# Patient Record
Sex: FEMALE | Race: AMERICAN INDIAN OR ALASKA NATIVE | URBAN - METROPOLITAN AREA
[De-identification: names, ages, dates, MRNs, and addresses within clinical notes are randomized per-mention and may not be internally consistent; named-entity substitution may affect disease eponyms.]

---

## 2021-02-03 ENCOUNTER — INPATIENT (INPATIENT)
Facility: HOSPITAL | Age: 56
LOS: 5 days | Discharge: HOME | End: 2021-02-09
Attending: INTERNAL MEDICINE | Admitting: INTERNAL MEDICINE
Payer: MEDICAID

## 2021-02-03 VITALS
RESPIRATION RATE: 20 BRPM | SYSTOLIC BLOOD PRESSURE: 99 MMHG | HEART RATE: 87 BPM | OXYGEN SATURATION: 100 % | TEMPERATURE: 98 F | DIASTOLIC BLOOD PRESSURE: 75 MMHG

## 2021-02-03 LAB
ALBUMIN SERPL ELPH-MCNC: 3 G/DL — LOW (ref 3.5–5.2)
ALBUMIN SERPL ELPH-MCNC: 3.8 G/DL — SIGNIFICANT CHANGE UP (ref 3.5–5.2)
ALP SERPL-CCNC: 122 U/L — HIGH (ref 30–115)
ALP SERPL-CCNC: 87 U/L — SIGNIFICANT CHANGE UP (ref 30–115)
ALT FLD-CCNC: 23 U/L — SIGNIFICANT CHANGE UP (ref 0–41)
ALT FLD-CCNC: 28 U/L — SIGNIFICANT CHANGE UP (ref 0–41)
ANION GAP SERPL CALC-SCNC: 12 MMOL/L — SIGNIFICANT CHANGE UP (ref 7–14)
ANION GAP SERPL CALC-SCNC: 15 MMOL/L — HIGH (ref 7–14)
APPEARANCE UR: CLEAR — SIGNIFICANT CHANGE UP
AST SERPL-CCNC: 48 U/L — HIGH (ref 0–41)
AST SERPL-CCNC: 56 U/L — HIGH (ref 0–41)
B-OH-BUTYR SERPL-SCNC: 1.8 MMOL/L — HIGH
BACTERIA # UR AUTO: ABNORMAL
BASE EXCESS BLDV CALC-SCNC: 2.8 MMOL/L — HIGH (ref -2–2)
BASOPHILS # BLD AUTO: 0.01 K/UL — SIGNIFICANT CHANGE UP (ref 0–0.2)
BASOPHILS # BLD AUTO: 0.02 K/UL — SIGNIFICANT CHANGE UP (ref 0–0.2)
BASOPHILS NFR BLD AUTO: 0.2 % — SIGNIFICANT CHANGE UP (ref 0–1)
BASOPHILS NFR BLD AUTO: 0.4 % — SIGNIFICANT CHANGE UP (ref 0–1)
BILIRUB DIRECT SERPL-MCNC: <0.2 MG/DL — SIGNIFICANT CHANGE UP (ref 0–0.2)
BILIRUB INDIRECT FLD-MCNC: >0.2 MG/DL — SIGNIFICANT CHANGE UP (ref 0.2–1.2)
BILIRUB SERPL-MCNC: 0.4 MG/DL — SIGNIFICANT CHANGE UP (ref 0.2–1.2)
BILIRUB SERPL-MCNC: <0.2 MG/DL — SIGNIFICANT CHANGE UP (ref 0.2–1.2)
BILIRUB UR-MCNC: NEGATIVE — SIGNIFICANT CHANGE UP
BUN SERPL-MCNC: 5 MG/DL — LOW (ref 10–20)
BUN SERPL-MCNC: 6 MG/DL — LOW (ref 10–20)
CA-I SERPL-SCNC: 1.18 MMOL/L — SIGNIFICANT CHANGE UP (ref 1.12–1.3)
CALCIUM SERPL-MCNC: 7.9 MG/DL — LOW (ref 8.5–10.1)
CALCIUM SERPL-MCNC: 8.7 MG/DL — SIGNIFICANT CHANGE UP (ref 8.5–10.1)
CHLORIDE SERPL-SCNC: 101 MMOL/L — SIGNIFICANT CHANGE UP (ref 98–110)
CHLORIDE SERPL-SCNC: 87 MMOL/L — LOW (ref 98–110)
CK MB CFR SERPL CALC: 1.3 NG/ML — SIGNIFICANT CHANGE UP (ref 0.6–6.3)
CK SERPL-CCNC: 25 U/L — SIGNIFICANT CHANGE UP (ref 0–225)
CO2 SERPL-SCNC: 22 MMOL/L — SIGNIFICANT CHANGE UP (ref 17–32)
CO2 SERPL-SCNC: 23 MMOL/L — SIGNIFICANT CHANGE UP (ref 17–32)
COLOR SPEC: SIGNIFICANT CHANGE UP
COMMENT - URINE: SIGNIFICANT CHANGE UP
CREAT SERPL-MCNC: 0.5 MG/DL — LOW (ref 0.7–1.5)
CREAT SERPL-MCNC: 0.7 MG/DL — SIGNIFICANT CHANGE UP (ref 0.7–1.5)
DIFF PNL FLD: NEGATIVE — SIGNIFICANT CHANGE UP
EOSINOPHIL # BLD AUTO: 0.01 K/UL — SIGNIFICANT CHANGE UP (ref 0–0.7)
EOSINOPHIL # BLD AUTO: 0.02 K/UL — SIGNIFICANT CHANGE UP (ref 0–0.7)
EOSINOPHIL NFR BLD AUTO: 0.2 % — SIGNIFICANT CHANGE UP (ref 0–8)
EOSINOPHIL NFR BLD AUTO: 0.4 % — SIGNIFICANT CHANGE UP (ref 0–8)
EPI CELLS # UR: 1 /HPF — SIGNIFICANT CHANGE UP (ref 0–5)
ERYTHROCYTE [SEDIMENTATION RATE] IN BLOOD: 32 MM/HR — HIGH (ref 0–20)
GAS PNL BLDV: 131 MMOL/L — LOW (ref 136–145)
GAS PNL BLDV: SIGNIFICANT CHANGE UP
GLUCOSE BLDC GLUCOMTR-MCNC: 260 MG/DL — HIGH (ref 70–99)
GLUCOSE BLDC GLUCOMTR-MCNC: 301 MG/DL — HIGH (ref 70–99)
GLUCOSE BLDC GLUCOMTR-MCNC: 310 MG/DL — HIGH (ref 70–99)
GLUCOSE BLDC GLUCOMTR-MCNC: 318 MG/DL — HIGH (ref 70–99)
GLUCOSE BLDC GLUCOMTR-MCNC: 413 MG/DL — HIGH (ref 70–99)
GLUCOSE BLDC GLUCOMTR-MCNC: 481 MG/DL — CRITICAL HIGH (ref 70–99)
GLUCOSE BLDC GLUCOMTR-MCNC: >600 MG/DL — CRITICAL HIGH (ref 70–99)
GLUCOSE SERPL-MCNC: 396 MG/DL — HIGH (ref 70–99)
GLUCOSE SERPL-MCNC: 947 MG/DL — CRITICAL HIGH (ref 70–99)
GLUCOSE UR QL: ABNORMAL
HCO3 BLDV-SCNC: 29 MMOL/L — SIGNIFICANT CHANGE UP (ref 22–29)
HCT VFR BLD CALC: 34.6 % — LOW (ref 37–47)
HCT VFR BLD CALC: 43 % — SIGNIFICANT CHANGE UP (ref 37–47)
HCT VFR BLDA CALC: 34.5 % — SIGNIFICANT CHANGE UP (ref 34–44)
HGB BLD CALC-MCNC: 11.2 G/DL — LOW (ref 14–18)
HGB BLD-MCNC: 10.5 G/DL — LOW (ref 12–16)
HGB BLD-MCNC: 13 G/DL — SIGNIFICANT CHANGE UP (ref 12–16)
HYALINE CASTS # UR AUTO: 2 /LPF — SIGNIFICANT CHANGE UP (ref 0–7)
IMM GRANULOCYTES NFR BLD AUTO: 0.4 % — HIGH (ref 0.1–0.3)
IMM GRANULOCYTES NFR BLD AUTO: 0.4 % — HIGH (ref 0.1–0.3)
KETONES UR-MCNC: SIGNIFICANT CHANGE UP
LACTATE BLDV-MCNC: 1.3 MMOL/L — SIGNIFICANT CHANGE UP (ref 0.5–1.6)
LEUKOCYTE ESTERASE UR-ACNC: ABNORMAL
LIDOCAIN IGE QN: 14 U/L — SIGNIFICANT CHANGE UP (ref 7–60)
LIDOCAIN IGE QN: 15 U/L — SIGNIFICANT CHANGE UP (ref 7–60)
LYMPHOCYTES # BLD AUTO: 1.31 K/UL — SIGNIFICANT CHANGE UP (ref 1.2–3.4)
LYMPHOCYTES # BLD AUTO: 1.52 K/UL — SIGNIFICANT CHANGE UP (ref 1.2–3.4)
LYMPHOCYTES # BLD AUTO: 25.3 % — SIGNIFICANT CHANGE UP (ref 20.5–51.1)
LYMPHOCYTES # BLD AUTO: 30.2 % — SIGNIFICANT CHANGE UP (ref 20.5–51.1)
MAGNESIUM SERPL-MCNC: 1.6 MG/DL — LOW (ref 1.8–2.4)
MCHC RBC-ENTMCNC: 24.8 PG — LOW (ref 27–31)
MCHC RBC-ENTMCNC: 25.1 PG — LOW (ref 27–31)
MCHC RBC-ENTMCNC: 30.2 G/DL — LOW (ref 32–37)
MCHC RBC-ENTMCNC: 30.3 G/DL — LOW (ref 32–37)
MCV RBC AUTO: 81.8 FL — SIGNIFICANT CHANGE UP (ref 81–99)
MCV RBC AUTO: 83 FL — SIGNIFICANT CHANGE UP (ref 81–99)
MONOCYTES # BLD AUTO: 0.31 K/UL — SIGNIFICANT CHANGE UP (ref 0.1–0.6)
MONOCYTES # BLD AUTO: 0.48 K/UL — SIGNIFICANT CHANGE UP (ref 0.1–0.6)
MONOCYTES NFR BLD AUTO: 6 % — SIGNIFICANT CHANGE UP (ref 1.7–9.3)
MONOCYTES NFR BLD AUTO: 9.5 % — HIGH (ref 1.7–9.3)
NEUTROPHILS # BLD AUTO: 2.99 K/UL — SIGNIFICANT CHANGE UP (ref 1.4–6.5)
NEUTROPHILS # BLD AUTO: 3.51 K/UL — SIGNIFICANT CHANGE UP (ref 1.4–6.5)
NEUTROPHILS NFR BLD AUTO: 59.3 % — SIGNIFICANT CHANGE UP (ref 42.2–75.2)
NEUTROPHILS NFR BLD AUTO: 67.7 % — SIGNIFICANT CHANGE UP (ref 42.2–75.2)
NITRITE UR-MCNC: NEGATIVE — SIGNIFICANT CHANGE UP
NRBC # BLD: 0 /100 WBCS — SIGNIFICANT CHANGE UP (ref 0–0)
NRBC # BLD: 0 /100 WBCS — SIGNIFICANT CHANGE UP (ref 0–0)
NT-PROBNP SERPL-SCNC: 161 PG/ML — SIGNIFICANT CHANGE UP (ref 0–300)
OSMOLALITY SERPL: 296 MOS/KG — SIGNIFICANT CHANGE UP (ref 275–300)
PCO2 BLDV: 51 MMHG — SIGNIFICANT CHANGE UP (ref 41–51)
PH BLDV: 7.36 — SIGNIFICANT CHANGE UP (ref 7.26–7.43)
PH UR: 6.5 — SIGNIFICANT CHANGE UP (ref 5–8)
PLATELET # BLD AUTO: 222 K/UL — SIGNIFICANT CHANGE UP (ref 130–400)
PLATELET # BLD AUTO: 291 K/UL — SIGNIFICANT CHANGE UP (ref 130–400)
PO2 BLDV: 29 MMHG — SIGNIFICANT CHANGE UP (ref 20–40)
POTASSIUM BLDV-SCNC: 3.3 MMOL/L — SIGNIFICANT CHANGE UP (ref 3.3–5.6)
POTASSIUM SERPL-MCNC: 3.2 MMOL/L — LOW (ref 3.5–5)
POTASSIUM SERPL-MCNC: 3.7 MMOL/L — SIGNIFICANT CHANGE UP (ref 3.5–5)
POTASSIUM SERPL-SCNC: 3.2 MMOL/L — LOW (ref 3.5–5)
POTASSIUM SERPL-SCNC: 3.7 MMOL/L — SIGNIFICANT CHANGE UP (ref 3.5–5)
PROT SERPL-MCNC: 5.7 G/DL — LOW (ref 6–8)
PROT SERPL-MCNC: 7.5 G/DL — SIGNIFICANT CHANGE UP (ref 6–8)
PROT UR-MCNC: NEGATIVE — SIGNIFICANT CHANGE UP
RBC # BLD: 4.23 M/UL — SIGNIFICANT CHANGE UP (ref 4.2–5.4)
RBC # BLD: 5.18 M/UL — SIGNIFICANT CHANGE UP (ref 4.2–5.4)
RBC # FLD: 14.5 % — SIGNIFICANT CHANGE UP (ref 11.5–14.5)
RBC # FLD: 14.7 % — HIGH (ref 11.5–14.5)
RBC CASTS # UR COMP ASSIST: 3 /HPF — SIGNIFICANT CHANGE UP (ref 0–4)
SAO2 % BLDV: 52 % — SIGNIFICANT CHANGE UP
SARS-COV-2 RNA SPEC QL NAA+PROBE: SIGNIFICANT CHANGE UP
SODIUM SERPL-SCNC: 125 MMOL/L — LOW (ref 135–146)
SODIUM SERPL-SCNC: 135 MMOL/L — SIGNIFICANT CHANGE UP (ref 135–146)
SP GR SPEC: 1.04 — HIGH (ref 1.01–1.03)
TROPONIN T SERPL-MCNC: <0.01 NG/ML — SIGNIFICANT CHANGE UP
TROPONIN T SERPL-MCNC: <0.01 NG/ML — SIGNIFICANT CHANGE UP
UROBILINOGEN FLD QL: SIGNIFICANT CHANGE UP
WBC # BLD: 5.04 K/UL — SIGNIFICANT CHANGE UP (ref 4.8–10.8)
WBC # BLD: 5.18 K/UL — SIGNIFICANT CHANGE UP (ref 4.8–10.8)
WBC # FLD AUTO: 5.04 K/UL — SIGNIFICANT CHANGE UP (ref 4.8–10.8)
WBC # FLD AUTO: 5.18 K/UL — SIGNIFICANT CHANGE UP (ref 4.8–10.8)
WBC UR QL: 45 /HPF — HIGH (ref 0–5)

## 2021-02-03 PROCEDURE — 99285 EMERGENCY DEPT VISIT HI MDM: CPT

## 2021-02-03 PROCEDURE — 93010 ELECTROCARDIOGRAM REPORT: CPT

## 2021-02-03 PROCEDURE — 76705 ECHO EXAM OF ABDOMEN: CPT | Mod: 26

## 2021-02-03 PROCEDURE — 71045 X-RAY EXAM CHEST 1 VIEW: CPT | Mod: 26

## 2021-02-03 RX ORDER — INSULIN GLARGINE 100 [IU]/ML
30 INJECTION, SOLUTION SUBCUTANEOUS AT BEDTIME
Refills: 0 | Status: DISCONTINUED | OUTPATIENT
Start: 2021-02-03 | End: 2021-02-04

## 2021-02-03 RX ORDER — INSULIN GLARGINE 100 [IU]/ML
30 INJECTION, SOLUTION SUBCUTANEOUS ONCE
Refills: 0 | Status: COMPLETED | OUTPATIENT
Start: 2021-02-03 | End: 2021-02-03

## 2021-02-03 RX ORDER — FOLIC ACID 0.8 MG
1 TABLET ORAL DAILY
Refills: 0 | Status: DISCONTINUED | OUTPATIENT
Start: 2021-02-03 | End: 2021-02-09

## 2021-02-03 RX ORDER — AZITHROMYCIN 500 MG/1
500 TABLET, FILM COATED ORAL ONCE
Refills: 0 | Status: COMPLETED | OUTPATIENT
Start: 2021-02-03 | End: 2021-02-03

## 2021-02-03 RX ORDER — DEXTROSE 50 % IN WATER 50 %
12.5 SYRINGE (ML) INTRAVENOUS ONCE
Refills: 0 | Status: DISCONTINUED | OUTPATIENT
Start: 2021-02-03 | End: 2021-02-09

## 2021-02-03 RX ORDER — CHLORHEXIDINE GLUCONATE 213 G/1000ML
1 SOLUTION TOPICAL
Refills: 0 | Status: DISCONTINUED | OUTPATIENT
Start: 2021-02-03 | End: 2021-02-09

## 2021-02-03 RX ORDER — MAGNESIUM SULFATE 500 MG/ML
1 VIAL (ML) INJECTION ONCE
Refills: 0 | Status: COMPLETED | OUTPATIENT
Start: 2021-02-03 | End: 2021-02-03

## 2021-02-03 RX ORDER — SODIUM CHLORIDE 9 MG/ML
1000 INJECTION INTRAMUSCULAR; INTRAVENOUS; SUBCUTANEOUS ONCE
Refills: 0 | Status: COMPLETED | OUTPATIENT
Start: 2021-02-03 | End: 2021-02-03

## 2021-02-03 RX ORDER — SODIUM CHLORIDE 9 MG/ML
1000 INJECTION, SOLUTION INTRAVENOUS
Refills: 0 | Status: DISCONTINUED | OUTPATIENT
Start: 2021-02-03 | End: 2021-02-09

## 2021-02-03 RX ORDER — INSULIN HUMAN 100 [IU]/ML
10 INJECTION, SOLUTION SUBCUTANEOUS ONCE
Refills: 0 | Status: COMPLETED | OUTPATIENT
Start: 2021-02-03 | End: 2021-02-03

## 2021-02-03 RX ORDER — ALBUTEROL 90 UG/1
2 AEROSOL, METERED ORAL EVERY 6 HOURS
Refills: 0 | Status: DISCONTINUED | OUTPATIENT
Start: 2021-02-03 | End: 2021-02-09

## 2021-02-03 RX ORDER — DEXTROSE 50 % IN WATER 50 %
25 SYRINGE (ML) INTRAVENOUS ONCE
Refills: 0 | Status: DISCONTINUED | OUTPATIENT
Start: 2021-02-03 | End: 2021-02-09

## 2021-02-03 RX ORDER — GUAIFENESIN/DEXTROMETHORPHAN 600MG-30MG
10 TABLET, EXTENDED RELEASE 12 HR ORAL EVERY 4 HOURS
Refills: 0 | Status: DISCONTINUED | OUTPATIENT
Start: 2021-02-03 | End: 2021-02-05

## 2021-02-03 RX ORDER — AZITHROMYCIN 500 MG/1
TABLET, FILM COATED ORAL
Refills: 0 | Status: DISCONTINUED | OUTPATIENT
Start: 2021-02-03 | End: 2021-02-04

## 2021-02-03 RX ORDER — SODIUM CHLORIDE 9 MG/ML
1000 INJECTION INTRAMUSCULAR; INTRAVENOUS; SUBCUTANEOUS
Refills: 0 | Status: DISCONTINUED | OUTPATIENT
Start: 2021-02-03 | End: 2021-02-05

## 2021-02-03 RX ORDER — INSULIN LISPRO 100/ML
8 VIAL (ML) SUBCUTANEOUS ONCE
Refills: 0 | Status: COMPLETED | OUTPATIENT
Start: 2021-02-03 | End: 2021-02-03

## 2021-02-03 RX ORDER — ALBUTEROL 90 UG/1
2 AEROSOL, METERED ORAL ONCE
Refills: 0 | Status: COMPLETED | OUTPATIENT
Start: 2021-02-03 | End: 2021-02-03

## 2021-02-03 RX ORDER — INSULIN LISPRO 100/ML
10 VIAL (ML) SUBCUTANEOUS
Refills: 0 | Status: DISCONTINUED | OUTPATIENT
Start: 2021-02-03 | End: 2021-02-04

## 2021-02-03 RX ORDER — AZITHROMYCIN 500 MG/1
500 TABLET, FILM COATED ORAL EVERY 24 HOURS
Refills: 0 | Status: DISCONTINUED | OUTPATIENT
Start: 2021-02-04 | End: 2021-02-04

## 2021-02-03 RX ORDER — PANTOPRAZOLE SODIUM 20 MG/1
40 TABLET, DELAYED RELEASE ORAL
Refills: 0 | Status: DISCONTINUED | OUTPATIENT
Start: 2021-02-03 | End: 2021-02-09

## 2021-02-03 RX ORDER — DEXTROSE 50 % IN WATER 50 %
15 SYRINGE (ML) INTRAVENOUS ONCE
Refills: 0 | Status: DISCONTINUED | OUTPATIENT
Start: 2021-02-03 | End: 2021-02-09

## 2021-02-03 RX ORDER — CEFTRIAXONE 500 MG/1
1000 INJECTION, POWDER, FOR SOLUTION INTRAMUSCULAR; INTRAVENOUS EVERY 24 HOURS
Refills: 0 | Status: DISCONTINUED | OUTPATIENT
Start: 2021-02-03 | End: 2021-02-05

## 2021-02-03 RX ORDER — ENOXAPARIN SODIUM 100 MG/ML
40 INJECTION SUBCUTANEOUS AT BEDTIME
Refills: 0 | Status: DISCONTINUED | OUTPATIENT
Start: 2021-02-03 | End: 2021-02-09

## 2021-02-03 RX ORDER — GLUCAGON INJECTION, SOLUTION 0.5 MG/.1ML
1 INJECTION, SOLUTION SUBCUTANEOUS ONCE
Refills: 0 | Status: DISCONTINUED | OUTPATIENT
Start: 2021-02-03 | End: 2021-02-09

## 2021-02-03 RX ORDER — THIAMINE MONONITRATE (VIT B1) 100 MG
100 TABLET ORAL AT BEDTIME
Refills: 0 | Status: DISCONTINUED | OUTPATIENT
Start: 2021-02-03 | End: 2021-02-09

## 2021-02-03 RX ORDER — MAGNESIUM SULFATE 500 MG/ML
2 VIAL (ML) INJECTION ONCE
Refills: 0 | Status: COMPLETED | OUTPATIENT
Start: 2021-02-03 | End: 2021-02-03

## 2021-02-03 RX ORDER — CEFTRIAXONE 500 MG/1
1000 INJECTION, POWDER, FOR SOLUTION INTRAMUSCULAR; INTRAVENOUS ONCE
Refills: 0 | Status: COMPLETED | OUTPATIENT
Start: 2021-02-03 | End: 2021-02-03

## 2021-02-03 RX ORDER — NICOTINE POLACRILEX 2 MG
1 GUM BUCCAL DAILY
Refills: 0 | Status: DISCONTINUED | OUTPATIENT
Start: 2021-02-03 | End: 2021-02-05

## 2021-02-03 RX ADMIN — ALBUTEROL 2 PUFF(S): 90 AEROSOL, METERED ORAL at 05:02

## 2021-02-03 RX ADMIN — ENOXAPARIN SODIUM 40 MILLIGRAM(S): 100 INJECTION SUBCUTANEOUS at 21:17

## 2021-02-03 RX ADMIN — SODIUM CHLORIDE 1000 MILLILITER(S): 9 INJECTION INTRAMUSCULAR; INTRAVENOUS; SUBCUTANEOUS at 10:23

## 2021-02-03 RX ADMIN — INSULIN HUMAN 10 UNIT(S): 100 INJECTION, SOLUTION SUBCUTANEOUS at 09:15

## 2021-02-03 RX ADMIN — AZITHROMYCIN 255 MILLIGRAM(S): 500 TABLET, FILM COATED ORAL at 14:47

## 2021-02-03 RX ADMIN — Medication 50 GRAM(S): at 08:31

## 2021-02-03 RX ADMIN — Medication 100 MILLIGRAM(S): at 21:18

## 2021-02-03 RX ADMIN — Medication 10 UNIT(S): at 17:33

## 2021-02-03 RX ADMIN — SODIUM CHLORIDE 1000 MILLILITER(S): 9 INJECTION INTRAMUSCULAR; INTRAVENOUS; SUBCUTANEOUS at 05:59

## 2021-02-03 RX ADMIN — Medication 10 MILLILITER(S): at 21:17

## 2021-02-03 RX ADMIN — CEFTRIAXONE 1000 MILLIGRAM(S): 500 INJECTION, POWDER, FOR SOLUTION INTRAMUSCULAR; INTRAVENOUS at 09:07

## 2021-02-03 RX ADMIN — INSULIN GLARGINE 30 UNIT(S): 100 INJECTION, SOLUTION SUBCUTANEOUS at 21:17

## 2021-02-03 RX ADMIN — Medication 100 GRAM(S): at 18:27

## 2021-02-03 RX ADMIN — SODIUM CHLORIDE 100 MILLILITER(S): 9 INJECTION INTRAMUSCULAR; INTRAVENOUS; SUBCUTANEOUS at 13:07

## 2021-02-03 RX ADMIN — Medication 2 GRAM(S): at 09:07

## 2021-02-03 RX ADMIN — CEFTRIAXONE 100 MILLIGRAM(S): 500 INJECTION, POWDER, FOR SOLUTION INTRAMUSCULAR; INTRAVENOUS at 08:31

## 2021-02-03 RX ADMIN — INSULIN HUMAN 10 UNIT(S): 100 INJECTION, SOLUTION SUBCUTANEOUS at 12:38

## 2021-02-03 RX ADMIN — CEFTRIAXONE 100 MILLIGRAM(S): 500 INJECTION, POWDER, FOR SOLUTION INTRAMUSCULAR; INTRAVENOUS at 14:47

## 2021-02-03 RX ADMIN — INSULIN GLARGINE 30 UNIT(S): 100 INJECTION, SOLUTION SUBCUTANEOUS at 13:06

## 2021-02-03 RX ADMIN — Medication 10 MILLILITER(S): at 17:34

## 2021-02-03 RX ADMIN — SODIUM CHLORIDE 200 MILLILITER(S): 9 INJECTION INTRAMUSCULAR; INTRAVENOUS; SUBCUTANEOUS at 21:18

## 2021-02-03 RX ADMIN — SODIUM CHLORIDE 1000 MILLILITER(S): 9 INJECTION INTRAMUSCULAR; INTRAVENOUS; SUBCUTANEOUS at 08:31

## 2021-02-03 NOTE — ED ADULT NURSE NOTE - NSIMPLEMENTINTERV_GEN_ALL_ED
Implemented All Fall Risk Interventions:  [place] to call system. Call bell, personal items and telephone within reach. Instruct patient to call for assistance. Room bathroom lighting operational. Non-slip footwear when patient is off stretcher. Physically safe environment: no spills, clutter or unnecessary equipment. Stretcher in lowest position, wheels locked, appropriate side rails in place. Provide visual cue, wrist band, yellow gown, etc. Monitor gait and stability. Monitor for mental status changes and reorient to person, place, and time. Review medications for side effects contributing to fall risk. Reinforce activity limits and safety measures with patient and family.

## 2021-02-03 NOTE — H&P ADULT - HISTORY OF PRESENT ILLNESS
56yo female w pmhx of  HTN, DM, Liver cirrhosis, ETOH abuse (last drink yesterday 2/2) and current smoker of 1/2pack/day x 40 years who  presents to ED with complaints of cough. Pt states cough has been on going and recurrent for a few months, productive with yellow sputum. Patient states that she recently moved back to [place] 2 weeks ago after spending the last 20+ years in [place] and the cough has bee progressively gotten worse since returning. Pt also endorses having >50lb unintentional weight loss over the past few months. Patient confirms past medical history but says has not taken any medications d/t financial issues. ROS positive for dizziness, chills, chest and belly pain, leg pain/tingling, and urinary/bowel incontinence.     In ED, T97.8, BP99/75, HR97, RR20. Labs are significant for Na 125, Glucose 947, AG 15, BHB 1.8; UA w large LE and mod bacteria. COVID neg. CXR neg.

## 2021-02-03 NOTE — ED ADULT NURSE REASSESSMENT NOTE - NS ED NURSE REASSESS COMMENT FT1
yellow bracelet in place, red socks, MA.  patient call bell in reach. questions and concerns addressed. IV site asymptomatic. chest rise symmetrical.

## 2021-02-03 NOTE — ED ADULT NURSE NOTE - OBJECTIVE STATEMENT
55 y F BIBA for dry cough and hypoxia  88%. on 4 L NC patient o2 saturation 100%. Patient with hx of asthma, no albuterol available as per patient "forgot in [place]". denies fever/CP. "Have lost 50 pounds over last few months and smoked in the past." Also had history of cirosis of liver.

## 2021-02-03 NOTE — SBIRT NOTE ADULT - NSSBIRTALCPOSREINDET_GEN_A_CORE
Patient reported that she drinks "too much" but would not elaborate further. SW offered for the CATCH team to speak with patient about resources/referrals and patient is agreeable. CATCH team made aware to speak with patient.

## 2021-02-03 NOTE — H&P ADULT - ASSESSMENT
54yo female w pmhx of  HTN, DM, Liver cirrhosis, ETOH abuse (last drink yesterday 2/2) and current smoker of 1/2pack/day x 40 years who presents for a productive cough and found to be Hyperglycemic on admission.    #Productive cough 2/2 viral vs bacterial pna  pt w long h/o smoking  CXR read clear though some concern for possible b/l developing masses  - f/u repeat cbc, procal, esr, crp  - obtain sputum culture, urine culture, urine legionella & s.pneumo, fungitell  - cont broad spectrum antibiotics  - Pulm consult placed    #DM w severe hyperglycemia on admission - noncompliant with medications  Glucose 947, AG 15, BHB1.8  - starting Lantus/Lispro - monitor FS  - cont IVF    #Suspected Vitamin Deficiencies  pt appear cachetic on exam  pt endorses drinking daily for years (last drink yesterday)  - cont MVI, thiamine, folate  - f/u pending labs  - nutrition consult placed    #HTN: controlled off medications  #h/o Liver Cirrhosis: monitor cmp    GI ppx: pantoprazole  DVT ppx: lovenox  Diet: DASH  Activity: IAT     56yo female w pmhx of  HTN, DM, Liver cirrhosis, ETOH abuse (last drink yesterday 2/2) and current smoker of 1/2pack/day x 40 years who presents for a productive cough and found to be Hyperglycemic on admission.    #Uncontrolled DM w severe hyperglycemia and hyponatremia - likely mixed picture of DKA & HHS  Glucose 947, AG 15, BHB1.8  pt endorses not taking home DM medications  s/p 20u regular insulin and 30u Lantus - Glucose correcting - last   - cont Lantus/Lispro - monitor FS - adjusted as sugars correct  - monitor bmp for correction AG and Na  - cont IVF - adjust as needed  - f/u A1c, lipid panel, lipase, amylase, cardiac enzymes  - CC/DASH diet    #Suspected UTI in setting of DKA/HHS  #Productive cough w hyperinflated CXR in setting of long term smoking likely w a component of emphysema  UA w large LE and mod bacteria but no leukocytosis or fevers  pt current smoker of >40years  - started on rocephin and azithromycin  - f/u procal, crp, and sputum/urine/blood cultures, urine legionella & s.pneumo, fungitell  - cont mucinex and inhalers prn  - cont nicotine patch and encourage smoking cessation  - will need outpatient pulm follow up - consult inpatient if patient decompensates    #Alcohol dependence disorder w suspected vitamin deficiencies  #h/o liver cirrhosis  pt appear cachetic on exam w etoh abuse hx (last drink yesterday)  - cont MVI, thiamine, folic acid  - cont CIWA monitoring  - f/u pending labs  - nutrition consult placed    #h/o HTN (not on home meds) - now w hypotension  - cont IVF and monitor    GI ppx: pantoprazole  DVT ppx: lovenox  Diet: DASH  Activity: IAT     56yo female w pmhx of  HTN, DM, Liver cirrhosis, ETOH abuse (last drink yesterday 2/2) and current smoker of 1/2pack/day x 40 years who presents for a productive cough and found to be Hyperglycemic on admission.    #Uncontrolled DM w severe hyperglycemia and hyponatremia - likely mixed picture of DKA & HHS  Glucose 947, AG 15, BHB1.8  pt endorses not taking home DM medications  s/p 20u regular insulin and 30u Lantus - Glucose correcting - last   - cont Lantus/Lispro - monitor FS - adjusted as sugars correct  - monitor bmp for correction AG and Na  - cont IVF - adjust as needed  - f/u A1c, lipid panel, lipase, amylase, cardiac enzymes  - CC/DASH diet    #Suspected UTI in setting of DKA/HHS  #Productive cough w hyperinflated CXR in setting of long term smoking likely w a component of emphysema  UA w large LE and mod bacteria but no leukocytosis or fevers  pt current smoker of >40years; CXR w hyperinflated lungs but otherwise neg  - started on rocephin and azithromycin  - f/u procal, crp, and sputum/urine/blood cultures, urine legionella & s.pneumo, fungitell  - cont mucinex and inhalers prn  - cont nicotine patch and encourage smoking cessation  - will need outpatient pulm follow up - consult inpatient if patient decompensates    #Alcohol dependence disorder w suspected vitamin deficiencies  #h/o liver cirrhosis  pt appear cachetic on exam w etoh abuse hx (last drink yesterday)  - cont MVI, thiamine, folic acid  - cont CIWA monitoring  - f/u pending labs  - nutrition consult placed    #h/o HTN (not on home meds) - now w hypotension  - cont IVF and monitor    GI ppx: pantoprazole  DVT ppx: lovenox  Diet: DASH  Activity: IAT     56yo female w pmhx of  HTN, DM, Liver cirrhosis, ETOH abuse (last drink yesterday 2/2) and current smoker of 1/2pack/day x 40 years who presents for a productive cough and found to be Hyperglycemic on admission.    #Uncontrolled DM w severe hyperglycemia and hyponatremia - likely mixed picture of DKA & HHS  Glucose 947, AG 15, BHB1.8  pt endorses not taking home DM medications  s/p 20u regular insulin and 30u Lantus - Glucose correcting - last   - cont Lantus/Lispro - monitor FS - adjust it as sugars correct  - monitor bmp for correction AG and Na  - cont IVF - adjust as needed  - f/u A1c, lipid panel, lipase, amylase, cardiac enzymes  - CC/DASH diet    #Suspected UTI in setting of DKA/HHS  #Productive cough w hyperinflated CXR in setting of long term smoking likely w a component of emphysema  UA w large LE and mod bacteria but no leukocytosis or fevers  pt current smoker of >40years; CXR w hyperinflated lungs but otherwise neg  - started on rocephin and azithromycin  - f/u procal, crp, and sputum/urine/blood cultures, urine legionella & s.pneumo, fungitell  - cont mucinex and inhalers prn  - cont nicotine patch and encourage smoking cessation  - will need outpatient pulm follow up - consult inpatient if patient decompensates    #Alcohol dependence disorder w suspected vitamin deficiencies  #h/o liver cirrhosis  pt appear cachetic on exam w etoh abuse hx (last drink yesterday)  - cont MVI, thiamine, folic acid  - cont CIWA monitoring  - f/u pending labs  - nutrition consult placed  -Check PT/INR. US liver, monitor LFTS.     #h/o HTN (not on home meds) - now w hypotension  - cont IVF and monitor    GI ppx: pantoprazole  DVT ppx: lovenox  Diet: DASH  Activity: IAT  Patient would need outpatient appointment for PCP and endocrine.    consult because patient cannot afford her medications.

## 2021-02-03 NOTE — ED ADULT TRIAGE NOTE - CHIEF COMPLAINT QUOTE
55 y F BIBA for dry cough and hypoxia  88%. on 4 L NC patient o2 saturation 100%. Patient with hx of asthma, no albuterol available as per patient "forgot in [place]". denies fever/CP

## 2021-02-03 NOTE — H&P ADULT - NSHPLABSRESULTS_GEN_ALL_CORE
LABS:                        13.0   5.18  )-----------( 291      ( 2021 06:38 )             43.0     125<L>  |  87<L>  |  6<L>  ----------------------------<  947<HH>  3.7   |  23  |  0.7    Ca    8.7      2021 04:44  Mg     1.6         TPro  7.5  /  Alb  3.8  /  TBili  0.4  /  DBili  <0.2  /  AST  48<H>  /  ALT  28  /  AlkPhos  122<H>      Urinalysis Basic - ( 2021 04:44 )  Color: Light Yellow / Appearance: Clear / S.036 / pH: x  Gluc: x / Ketone: Trace  / Bili: Negative / Urobili: <2 mg/dL   Blood: x / Protein: Negative / Nitrite: Negative   Leuk Esterase: Large / RBC: 3 /HPF / WBC 45 /HPF   Sq Epi: x / Non Sq Epi: 1 /HPF / Bacteria: Moderate    Troponin T, Serum: <0.01 ng/mL (21 @ 04:44)    Serum Pro-Brain Natriuretic Peptide: 161 pg/mL (21 @ 04:44)    IMAGING:  Xray Chest 1 View- PORTABLE-Urgent (21 @ 06:35)  Impression:  No radiographic evidence of acute cardiopulmonary disease.

## 2021-02-03 NOTE — ED PROVIDER NOTE - ATTENDING CONTRIBUTION TO CARE
55F smoker h/o asthma, dm, htn, alcoholic cirrhosis, recently moved from [place] few wks ago p/w sob & cough x many weeks. Worsened tonight, daughter called 911. Pt hypoxic on arrival to 88 on RA, normal on 4lnc. Denies f/c, uri sx, cp, nvd, abd pain, edema.    PE:  cachectic middle-aged f, nad  skin warm, dry  ncat  neck supple  rrr nl s1s2 no mrg  nl wob, good air entry bl, +exp wheezes bl, no rales/rhonchi  abd soft ntnd no palpable masses no rgr  back non-tender no cvat  ext no cce dpi  neuro aaox3 grossly nf exam

## 2021-02-03 NOTE — ED PROVIDER NOTE - PHYSICAL EXAMINATION
Physical Exam    Vital Signs: I have reviewed the initial vital signs.  Constitutional: Frail, thin and cachectic, appears stated age, no acute distress  Eyes: Conjunctiva pink, Sclera clear, PERRLA, EOMI.  Cardiovascular: S1 and S2, regular rate, regular rhythm, well-perfused extremities, radial pulses equal and 2+  Respiratory: unlabored respiratory effort, wheezing bilaterally at bases no rales or rhonchi, no accessory muscle use.  Gastrointestinal: soft, non-tender abdomen, no pulsatile mass, normal bowl sounds  Musculoskeletal: supple neck, no lower extremity edema, no midline tenderness  Integumentary: warm, dry, no rash  Neurologic: awake, alert, cranial nerves II-XII grossly intact, extremities’ motor and sensory functions grossly intact  Psychiatric: appropriate mood, appropriate affect

## 2021-02-03 NOTE — H&P ADULT - ATTENDING COMMENTS
Agree with resident's note, HPI, PE, assessment and plan.  Pt was seen and examined independently.    56yo female, noncompliant with medications and doctors follow up, who recently moved from [place], x significant for HTN, DM2 (not compliant with Lantus), liver cirrhosis, ETOH dependence (last drink on 2/2), chronic smoker, 1/2 pack a day for >40 years, presented to ED with complaints of generalized weakness, decreased appetite, cough and burning urination with vaginal itching. Cough is chronic, associated with yellowish sputum. Pt reports she lost ~50lbs in last 3 months unintentionally.     CONSTITUTIONAL: +night sweats, +weakness, +loss of appetite and weight loss  EYES/ENT: No visual changes;  No vertigo or throat pain   NECK: No pain or stiffness  RESPIRATORY: + cough with yellowish sputum, no hemoptysis, no SOB  CARDIOVASCULAR: No chest pain or palpitations  GASTROINTESTINAL: No hematochezia, no melena.   GENITOURINARY: +Dysuria.   NEUROLOGICAL: No numbness or weakness  SKIN: No itching, rashes     T(C): 36.7  HR: 69  BP: 91/60  RR: 18  SpO2: 100%    Physical exam:  NAD, cachectic  HEENT: PERRL, moist mucous membranes   NECK: supple, no JVD  RESP: CTA b/l, no crackles, or rhonchi  CVS: S1S2, RRR  GI: abdomen soft NT, ND  Extremities: no cyanosis, no legs edema, no calf tenderness  NEURO: AOx3, no focal deficit  H/L: no enlarged LN noted     LABS: Hb 10.1, MCV 82.4, Blood glucose 947 >> 396 >>36, Na 125 >> 135>> 141, cr 0.5    CXR: No focal consolidation, pneumothorax or pleural effusion.    EKG: NSR @ 73    A/P: # HHS. DM2 with hyperglycemia likely due to noncompliance now with hypoglycemia, but asymptomatic.  - started on lantus, will decrease dose to 20 units QHS, will add insulin lispro sliding scale, will adjust dose based on requirements.  - f/u HbA1C  - pt will benefit from ASA and Statins for CAD ppx     # Weight loss, rule out malignancy, pt is a chronic heavy smoker, not up to date with age appropriate cancer screening. States she had mammogram done 3 years ago, no PAP smear for many years, colonoscopy many years ago. +B symptoms, +anemia.  - will do CT chest  - send AFP, CEA, iron studies   - rest of work up likely will be done as OP.     # Hypokalemia - supplement with IV 40 meq x3 Q 2 hrs, repeat level tomorrow     # Hyponatremia due to hypoglycemia - resolved    # Suspected UTI - UA positive for LE and bacteria wit WBC, currently on Rocephin, if UCx negative can dc ABx.    # Vulvovaginal candidiasis - give one dose of Diflucan    # Alcohol dependence with abuse - no signs of withdrawal, monitor CIWA, Ativan PRN. Please check phosphorous.  - CATCH team eval.     # Liver cirrhosis - LFT's mildly elevated AST, no signs of ascites, normal Plt, normal creatinine and INR.    # Nicotine dependence - smoking cessation counselling provided, 15 min spent.     DVT ppx

## 2021-02-03 NOTE — H&P ADULT - NSHPPHYSICALEXAM_GEN_ALL_CORE
Vital Signs Last 24 Hrs  T(C): 36.6 (03 Feb 2021 07:19), Max: 36.6 (03 Feb 2021 04:29)  T(F): 97.9 (03 Feb 2021 07:19), Max: 97.9 (03 Feb 2021 07:19)  HR: 82 (03 Feb 2021 07:19) (82 - 87)  BP: 95/67 (03 Feb 2021 07:19) (95/67 - 99/75)  RR: 16 (03 Feb 2021 07:19) (16 - 20)  SpO2: 100% (03 Feb 2021 07:19) (99% - 100%)    PHYSICAL EXAM  GENERAL: NAD, very thin/cachetic and lethargic appearing  HEAD:  NCAT, EOMI, PERRL, conjunctiva clear  NECK: Supple, No JVD, Normal thyroid  NERVOUS SYSTEM: AAOx3, no focal deficits  CHEST/LUNG: equal but decr breath sounds b/l  HEART: +s1s2 RRR no m/g/r  ABDOMEN: soft w mild ternderness to palpation  EXTREMITIES:  2+ Peripheral Pulses, No c/c/e  LYMPH: No lymphadenopathy noted  SKIN: No rashes or lesions

## 2021-02-03 NOTE — ED PROVIDER NOTE - OBJECTIVE STATEMENT
Pt is a 55 year old female with PMH HTN, DM, Cirrhosis of liver, ETOH abuse and current smoker of 1-2 packs/day x 40 years presents to ED with complaints of cough. Pt states cough has been on going and recurrent for a few months, productive with yellow/green sputum. Pt also attests to unintentional weight loss of 50-60 lbs in past few months. Pt denies any fever, chills, body aches. pt does attest to sob worse with exertion, Denies chest pain, abdominal pain, NVCD, dysuria

## 2021-02-03 NOTE — ED ADULT NURSE REASSESSMENT NOTE - INTERVENTIONS DEFINITIONS
[place] to call system/Call bell, personal items and telephone within reach/Instruct patient to call for assistance/Room bathroom lighting operational/Non-slip footwear when patient is off stretcher/Physically safe environment: no spills, clutter or unnecessary equipment/Stretcher in lowest position, wheels locked, appropriate side rails in place/Provide visual cue, wrist band, yellow gown, etc./Monitor gait and stability/Monitor for mental status changes and reorient to person, place, and time/Review medications for side effects contributing to fall risk/Reinforce activity limits and safety measures with patient and family/Provide visual clues: red socks

## 2021-02-03 NOTE — ED PROVIDER NOTE - NS ED ROS FT
Constitutional: (-) fever (+) weight loss   Eyes/ENT: (-) blurry vision, (-) epistaxis  Cardiovascular: (-) chest pain, (-) syncope  Respiratory: (+) cough, (+) shortness of breath  Gastrointestinal: (-) vomiting, (-) diarrhea  Musculoskeletal: (-) neck pain, (-) back pain, (-) joint pain  Integumentary: (-) rash, (-) edema  Neurological: (-) headache, (-) altered mental status  Psychiatric: (-) hallucinations  Allergic/Immunologic: (-) pruritus

## 2021-02-03 NOTE — ED PROVIDER NOTE - PROGRESS NOTE DETAILS
PATY: sign out received from SHONDA Merida, patient here with 6 months of ongoing shortness of breath, cough, and weight loss of ~6 pounds. Pt is a heavy smoker, 2 ppd, with worsening shortness of breath tonight. Per EMS pt was hypoxic down to 88% on RA, however patient maintains O2 sats >95% on RA here. Pt is cachectic, coarse breath sounds b/l however no resp distress. CXR concerning for b/l lung masses. labs pending, will admit for further work up as patient has poor outpatient f/u. patient agreeable and aware of plan. PATY: sign out received from SHONDA Merida, patient here with 6 months of ongoing shortness of breath, cough, and weight loss of ~60 pounds. Pt is a heavy smoker, 2 ppd, with worsening shortness of breath tonight. Per EMS pt was hypoxic down to 88% on RA, however patient maintains O2 sats >95% on RA here. Pt is cachectic, coarse breath sounds b/l however no resp distress. CXR concerning for b/l lung masses. labs pending, will admit for further work up as patient has poor outpatient f/u. patient agreeable and aware of plan.

## 2021-02-03 NOTE — H&P ADULT - NSHPSOCIALHISTORY_GEN_ALL_CORE
Patient endorses being a current smoker for >40 years (1/2 ppd); she consumes alcohol daily - unable to quantify how much (last drink yesterday); denies illicit drug use. Patient moved back to [place] 2 weeks ago after spending 20+ years in [place].

## 2021-02-03 NOTE — ED PROVIDER NOTE - CARE PLAN
Principal Discharge DX:	Hypoxia  Secondary Diagnosis:	SOB (shortness of breath)  Secondary Diagnosis:	Asthma  Secondary Diagnosis:	Lung abnormality   Principal Discharge DX:	Hypoxia  Secondary Diagnosis:	SOB (shortness of breath)  Secondary Diagnosis:	Asthma  Secondary Diagnosis:	Lung abnormality  Secondary Diagnosis:	Hyperglycemia due to diabetes mellitus

## 2021-02-03 NOTE — ED PROVIDER NOTE - CLINICAL SUMMARY MEDICAL DECISION MAKING FREE TEXT BOX
hypoxia, sob, cachexia - nebs/steroids, suppl O2, cxr hyperinflated lungs w/circular opacity/poss ?mass RLL - admitted for further w/u & mgmt

## 2021-02-04 LAB
ALBUMIN SERPL ELPH-MCNC: 2.9 G/DL — LOW (ref 3.5–5.2)
ALP SERPL-CCNC: 81 U/L — SIGNIFICANT CHANGE UP (ref 30–115)
ALT FLD-CCNC: 26 U/L — SIGNIFICANT CHANGE UP (ref 0–41)
AMYLASE P1 CFR SERPL: 89 U/L — SIGNIFICANT CHANGE UP (ref 25–115)
ANION GAP SERPL CALC-SCNC: 10 MMOL/L — SIGNIFICANT CHANGE UP (ref 7–14)
AST SERPL-CCNC: 51 U/L — HIGH (ref 0–41)
BASOPHILS # BLD AUTO: 0.02 K/UL — SIGNIFICANT CHANGE UP (ref 0–0.2)
BASOPHILS NFR BLD AUTO: 0.3 % — SIGNIFICANT CHANGE UP (ref 0–1)
BILIRUB SERPL-MCNC: <0.2 MG/DL — SIGNIFICANT CHANGE UP (ref 0.2–1.2)
BUN SERPL-MCNC: 7 MG/DL — LOW (ref 10–20)
CALCIUM SERPL-MCNC: 7.6 MG/DL — LOW (ref 8.5–10.1)
CHLORIDE SERPL-SCNC: 107 MMOL/L — SIGNIFICANT CHANGE UP (ref 98–110)
CHOLEST SERPL-MCNC: 65 MG/DL — SIGNIFICANT CHANGE UP
CO2 SERPL-SCNC: 24 MMOL/L — SIGNIFICANT CHANGE UP (ref 17–32)
CREAT SERPL-MCNC: <0.5 MG/DL — LOW (ref 0.7–1.5)
CRP SERPL-MCNC: 0.23 MG/DL — SIGNIFICANT CHANGE UP (ref 0–0.4)
EOSINOPHIL # BLD AUTO: 0.03 K/UL — SIGNIFICANT CHANGE UP (ref 0–0.7)
EOSINOPHIL NFR BLD AUTO: 0.5 % — SIGNIFICANT CHANGE UP (ref 0–8)
FOLATE SERPL-MCNC: 7.8 NG/ML — SIGNIFICANT CHANGE UP
GLUCOSE BLDC GLUCOMTR-MCNC: 126 MG/DL — HIGH (ref 70–99)
GLUCOSE BLDC GLUCOMTR-MCNC: 149 MG/DL — HIGH (ref 70–99)
GLUCOSE BLDC GLUCOMTR-MCNC: 157 MG/DL — HIGH (ref 70–99)
GLUCOSE BLDC GLUCOMTR-MCNC: 49 MG/DL — CRITICAL LOW (ref 70–99)
GLUCOSE BLDC GLUCOMTR-MCNC: 87 MG/DL — SIGNIFICANT CHANGE UP (ref 70–99)
GLUCOSE SERPL-MCNC: 36 MG/DL — CRITICAL LOW (ref 70–99)
HCT VFR BLD CALC: 33.3 % — LOW (ref 37–47)
HCV AB S/CO SERPL IA: 0.03 COI — SIGNIFICANT CHANGE UP
HCV AB SERPL-IMP: SIGNIFICANT CHANGE UP
HDLC SERPL-MCNC: 35 MG/DL — LOW
HGB BLD-MCNC: 10.1 G/DL — LOW (ref 12–16)
IMM GRANULOCYTES NFR BLD AUTO: 0.5 % — HIGH (ref 0.1–0.3)
INR BLD: 0.95 RATIO — SIGNIFICANT CHANGE UP (ref 0.65–1.3)
LIDOCAIN IGE QN: 12 U/L — SIGNIFICANT CHANGE UP (ref 7–60)
LIPID PNL WITH DIRECT LDL SERPL: 18 MG/DL — SIGNIFICANT CHANGE UP
LYMPHOCYTES # BLD AUTO: 2.4 K/UL — SIGNIFICANT CHANGE UP (ref 1.2–3.4)
LYMPHOCYTES # BLD AUTO: 38.2 % — SIGNIFICANT CHANGE UP (ref 20.5–51.1)
MAGNESIUM SERPL-MCNC: 1.9 MG/DL — SIGNIFICANT CHANGE UP (ref 1.8–2.4)
MCHC RBC-ENTMCNC: 25 PG — LOW (ref 27–31)
MCHC RBC-ENTMCNC: 30.3 G/DL — LOW (ref 32–37)
MCV RBC AUTO: 82.4 FL — SIGNIFICANT CHANGE UP (ref 81–99)
MONOCYTES # BLD AUTO: 0.54 K/UL — SIGNIFICANT CHANGE UP (ref 0.1–0.6)
MONOCYTES NFR BLD AUTO: 8.6 % — SIGNIFICANT CHANGE UP (ref 1.7–9.3)
NEUTROPHILS # BLD AUTO: 3.26 K/UL — SIGNIFICANT CHANGE UP (ref 1.4–6.5)
NEUTROPHILS NFR BLD AUTO: 51.9 % — SIGNIFICANT CHANGE UP (ref 42.2–75.2)
NON HDL CHOLESTEROL: 30 MG/DL — SIGNIFICANT CHANGE UP
NRBC # BLD: 0 /100 WBCS — SIGNIFICANT CHANGE UP (ref 0–0)
PHOSPHATE SERPL-MCNC: 2.6 MG/DL — SIGNIFICANT CHANGE UP (ref 2.1–4.9)
PLATELET # BLD AUTO: 246 K/UL — SIGNIFICANT CHANGE UP (ref 130–400)
POTASSIUM SERPL-MCNC: 2.8 MMOL/L — LOW (ref 3.5–5)
POTASSIUM SERPL-SCNC: 2.8 MMOL/L — LOW (ref 3.5–5)
PROCALCITONIN SERPL-MCNC: 0.05 NG/ML — SIGNIFICANT CHANGE UP (ref 0.02–0.1)
PROT SERPL-MCNC: 5.3 G/DL — LOW (ref 6–8)
PROTHROM AB SERPL-ACNC: 10.9 SEC — SIGNIFICANT CHANGE UP (ref 9.95–12.87)
RBC # BLD: 4.04 M/UL — LOW (ref 4.2–5.4)
RBC # FLD: 14.6 % — HIGH (ref 11.5–14.5)
SODIUM SERPL-SCNC: 141 MMOL/L — SIGNIFICANT CHANGE UP (ref 135–146)
TRIGL SERPL-MCNC: 79 MG/DL — SIGNIFICANT CHANGE UP
TROPONIN T SERPL-MCNC: <0.01 NG/ML — SIGNIFICANT CHANGE UP
VIT B12 SERPL-MCNC: 1733 PG/ML — HIGH (ref 232–1245)
WBC # BLD: 6.28 K/UL — SIGNIFICANT CHANGE UP (ref 4.8–10.8)
WBC # FLD AUTO: 6.28 K/UL — SIGNIFICANT CHANGE UP (ref 4.8–10.8)

## 2021-02-04 PROCEDURE — 99406 BEHAV CHNG SMOKING 3-10 MIN: CPT

## 2021-02-04 PROCEDURE — 71045 X-RAY EXAM CHEST 1 VIEW: CPT | Mod: 26

## 2021-02-04 PROCEDURE — 93010 ELECTROCARDIOGRAM REPORT: CPT

## 2021-02-04 PROCEDURE — 99223 1ST HOSP IP/OBS HIGH 75: CPT

## 2021-02-04 RX ORDER — FLUCONAZOLE 150 MG/1
150 TABLET ORAL ONCE
Refills: 0 | Status: COMPLETED | OUTPATIENT
Start: 2021-02-04 | End: 2021-02-04

## 2021-02-04 RX ORDER — HYDROXYZINE HCL 10 MG
25 TABLET ORAL EVERY 8 HOURS
Refills: 0 | Status: DISCONTINUED | OUTPATIENT
Start: 2021-02-04 | End: 2021-02-09

## 2021-02-04 RX ORDER — INSULIN GLARGINE 100 [IU]/ML
20 INJECTION, SOLUTION SUBCUTANEOUS AT BEDTIME
Refills: 0 | Status: DISCONTINUED | OUTPATIENT
Start: 2021-02-04 | End: 2021-02-09

## 2021-02-04 RX ORDER — POTASSIUM CHLORIDE 20 MEQ
20 PACKET (EA) ORAL
Refills: 0 | Status: COMPLETED | OUTPATIENT
Start: 2021-02-04 | End: 2021-02-05

## 2021-02-04 RX ORDER — HYDROXYZINE HCL 10 MG
25 TABLET ORAL ONCE
Refills: 0 | Status: COMPLETED | OUTPATIENT
Start: 2021-02-04 | End: 2021-02-04

## 2021-02-04 RX ADMIN — FLUCONAZOLE 150 MILLIGRAM(S): 150 TABLET ORAL at 14:56

## 2021-02-04 RX ADMIN — Medication 1 MILLIGRAM(S): at 10:27

## 2021-02-04 RX ADMIN — Medication 8 UNIT(S): at 01:05

## 2021-02-04 RX ADMIN — Medication 10 MILLILITER(S): at 05:52

## 2021-02-04 RX ADMIN — Medication 10 MILLILITER(S): at 22:44

## 2021-02-04 RX ADMIN — CEFTRIAXONE 100 MILLIGRAM(S): 500 INJECTION, POWDER, FOR SOLUTION INTRAMUSCULAR; INTRAVENOUS at 14:56

## 2021-02-04 RX ADMIN — Medication 25 MILLIGRAM(S): at 22:43

## 2021-02-04 RX ADMIN — ENOXAPARIN SODIUM 40 MILLIGRAM(S): 100 INJECTION SUBCUTANEOUS at 22:44

## 2021-02-04 RX ADMIN — Medication 10 MILLILITER(S): at 10:26

## 2021-02-04 RX ADMIN — Medication 10 MILLILITER(S): at 18:07

## 2021-02-04 RX ADMIN — SODIUM CHLORIDE 100 MILLILITER(S): 9 INJECTION INTRAMUSCULAR; INTRAVENOUS; SUBCUTANEOUS at 14:58

## 2021-02-04 RX ADMIN — Medication 1 TABLET(S): at 10:31

## 2021-02-04 RX ADMIN — Medication 25 MILLIGRAM(S): at 11:00

## 2021-02-04 RX ADMIN — Medication 10 MILLILITER(S): at 12:43

## 2021-02-04 RX ADMIN — PANTOPRAZOLE SODIUM 40 MILLIGRAM(S): 20 TABLET, DELAYED RELEASE ORAL at 05:54

## 2021-02-04 RX ADMIN — Medication 50 MILLIEQUIVALENT(S): at 19:08

## 2021-02-04 RX ADMIN — Medication 100 MILLIGRAM(S): at 22:44

## 2021-02-04 RX ADMIN — Medication 10 MILLILITER(S): at 01:05

## 2021-02-04 RX ADMIN — Medication 50 MILLIEQUIVALENT(S): at 15:08

## 2021-02-04 NOTE — PROGRESS NOTE ADULT - ASSESSMENT
56yo female w pmhx of  HTN, DM, Liver cirrhosis, ETOH abuse (last drink yesterday 2/2) and current smoker of 1/2pack/day x 40 years who presents for a productive cough and found to be Hyperglycemic on admission.    #Uncontrolled DM w severe hyperglycemia--resolved  - On admission glucose 947, AG 15, BHB 1.8, hyponatremia (normal when correcting for glucose)  - Pt endorses not taking home DM medications  - Glucose controlled, became hypoglycemic, reduced insulin  - cont IVF - adjust as needed  - f/u A1c, lipid panel, lipase, amylase, cardiac enzymes    #Suspected UTI in setting of DKA/HHS  #Productive cough w hyperinflated CXR in setting of long term smoking likely w a component of emphysema  - UA w large LE and mod bacteria but no leukocytosis or fevers  - >40 years hx of smoking; CXR w hyperinflated lungs but otherwise neg  - Given smoking hx and unintentional weight loss, will get CT chest  - d/c azithromycin, c/w ceftriaxone for now  - Procal 0.05  - Cultures pending  - Outpatient pulm follow up    #Alcohol dependence disorder w suspected vitamin deficiencies  #h/o liver cirrhosis  - Last drink day before admission  - Not in withdrawal  - CIWA PRN  - MVI, thiamine, folate  - ALP and AST mildly elevated  - f/u AFB, CEA  - RUQ US: hepatic steatosis, contracted GB  - CATCH team eval    #h/o HTN (not on home meds)  - Hypotensive on admission  - c/w IVF    GI ppx: pantoprazole  DVT ppx: Lovenox  Diet: DASH  Activity: IAT  Patient would need outpatient appointment for PCP and endocrine.    consult because patient cannot afford her medications.      56yo female w pmhx of  HTN, DM, Liver cirrhosis, ETOH abuse (last drink yesterday 2/2) and current smoker of 1/2pack/day x 40 years who presents for a productive cough and found to be Hyperglycemic on admission.    #Uncontrolled DM w severe hyperglycemia--resolved  - On admission glucose 947, AG 15, BHB 1.8, hyponatremia (normal when correcting for glucose)  - Pt endorses not taking home DM medications  - Glucose controlled, became hypoglycemic, reduced insulin  - cont IVF - adjust as needed  - f/u A1c, lipid panel, lipase, amylase, cardiac enzymes    #Suspected UTI in setting of DKA/HHS  #Productive cough w hyperinflated CXR in setting of long term smoking likely w a component of emphysema  - UA w large LE and mod bacteria but no leukocytosis or fevers  - >40 years hx of smoking; CXR w hyperinflated lungs but otherwise neg  - Given smoking hx, unintentional weight loss, symptoms of night sweats, will get CT chest and abdomen with IV contrast to r/o lymphoma  - d/c azithromycin, c/w ceftriaxone for now  - Procal 0.05  - Cultures pending  - Outpatient pulm follow up    #Alcohol dependence disorder w suspected folate and thiamine deficiency  #h/o liver cirrhosis  - Last drink day before admission  - Not in withdrawal  - CIWA PRN  - c/w MVI, thiamine, folate  - ALP and AST mildly elevated  - f/u AFB, CEA  - RUQ US: hepatic steatosis, contracted GB  - CATCH team eval    #h/o HTN (not on home meds)  - Hypotensive on admission  - c/w IVF    GI ppx: pantoprazole  DVT ppx: Lovenox  Diet: DASH  Activity: IAT  Patient would need outpatient appointment for PCP and endocrine.    consult because patient cannot afford her medications.

## 2021-02-04 NOTE — PATIENT PROFILE ADULT - NSPROGENSOURCEINFO_GEN_A_NUR
Intensive Care Unit Progress Note  Chief Complaint:  Acute on chronic hypoxic hypercapnic respiratory failure, severe sepsis, severe hyponatremia, urinary tract infection and possible healthcare associated pneumonia    Subjective:  Interval History:  Patient seems to be improving slowly.  Examined today sitting in the bed eating breakfast and watching TV.     Objective:  Scheduled Medications:  • pregabalin  150 mg Oral BID   • buprenorphine-naLOXone  0.5 each Sublingual 2 times per day   • insulin glargine  40 Units Subcutaneous Nightly   • predniSONE  20 mg Oral BID WC   • famotidine  20 mg Oral 2 times per day   • B complex-vitamin C-folic acid  1 tablet Oral Daily   • omega-3 acid ethyl esters  2 g Oral BID   • cefepime (MAXIPIME) IVPB  1,000 mg Intravenous Q12H   • heparin (porcine)  7,500 Units Subcutaneous 3 times per day   • albuterol-ipratropium 2.5 mg/0.5 mg  3 mL Nebulization 4x Daily Resp   • AMIODarone  200 mg Oral Daily   • aspirin  81 mg Oral Daily   • DULoxetine  20 mg Oral Daily   • ferrous sulfate  325 mg Oral BID WC   • lamoTRIgine  25 mg Oral Daily   • levothyroxine  137 mcg Oral QAM AC   • lidocaine  1 patch Transdermal Daily   • nicotine  1 patch Transdermal Daily   • nystatin   Topical 2 times per day   • [Held by provider] sacubitril-valsartan  1 tablet Oral BID   • sodium chloride (PF)  2 mL Intracatheter 2 times per day   • influenza virus quadrivalent vaccine inactivated injection  0.5 mL Intramuscular Once   • metoPROLOL succinate  25 mg Oral Daily   • sodium chloride  1,000 mL Intravenous Once     Continuous Infusions:  • insulin regular (human) (HumuLIN R) 100 units in sodium chloride 0.9% 100 mL infusion 2 Units/hr (10/18/19 0936)   • dextrose 5 % infusion     .     Vital signs:  Vital 24 Hour Range Last Value   Temperature Temp  Min: 97.7 °F (36.5 °C)  Max: 98.6 °F (37 °C) 97.7 °F (36.5 °C)   Pulse Pulse  Min: 53  Max: 83 61   Respiratory Resp  Min: 18  Max: 21 20   Blood Pressure  BP  Min: 112/57  Max: 177/78 128/65   Pulse Oximetry SpO2  Min: 85 %  Max: 98 % 94 %   Arterial Blood Pressure No data recorded 113/54   Mean Arterial Blood Pressure No data recorded 74 mmHg     Vital First Value Last Value   Weight Weight: 122.3 kg 117.5 kg   Height N/A 5' 3\" (160 cm)   Body Mass Index N/A 45.89       Weight over the past 48 Hours:  Patient Vitals for the past 48 hrs:   Weight   10/18/19 0401 117.5 kg       Intake/Output:  I/O this shift:  In: 505.8 [P.O.:360; I.V.:6.8; IV Piggyback:139]  Out: 950 [Urine:950]  I/O last 3 completed shifts:  In: 1312 [P.O.:822; I.V.:390; IV Piggyback:100]  Out: 1000 [Urine:1000]    Intake/Output Summary (Last 24 hours) at 10/18/2019 1020  Last data filed at 10/18/2019 0900  Gross per 24 hour   Intake 1457.8 ml   Output 1600 ml   Net -142.2 ml       Rhythm: Sinus Rhythm    Physical Exam:    General:   lying in the bed watching TV  HEENT: Pink conjunctivae and anicteric sclera.  Neck:  Supple and no thyroidomegaly  Thorax and Lungs:   decreased breath sound.  Minimal rales no wheezing.  Cardiovascular: S1 and S2 normal. No gallop or murmur. Distant heart sounds  Abdomen: Soft, nontender no organomegaly.  Extremities: Minimal lower extremity edema  Neurological:  No focal neurologic deficit.  Labs:    Recent Labs   Lab 10/15/19  0433 10/14/19  0607 10/13/19  0800   WBC 7.7 9.2 13.9*   HGB 9.8* 10.0* 10.9*   HCT 30.6* 30.6* 32.7*    319 355    Recent Labs   Lab 10/18/19  0428 10/17/19  0452 10/16/19  1925 10/16/19  1140  10/15/19  0433 10/14/19  1655 10/14/19  1155  10/11/19  2225   SODIUM  --  140 136 139   < > 133* 133* 131*   < > 118*   POTASSIUM 4.9 4.8 5.3* 5.0   < > 5.4* 5.0 4.5   < > 4.5   CHLORIDE  --  102 100 103   < > 100 99 99   < > 84*   CO2  --  33* 27 30   < > 28 29 29   < > 20*   BUN  --  43* 42* 43*   < > 38* 39* 43*   < > 48*   CREATININE  --  1.27* 1.24* 1.17*   < > 1.25* 1.26* 1.30*   < > 2.59*   CALCIUM  --  9.0 8.8 8.8   < > 8.4 8.4 8.8   < >  8.7   ALBUMIN  --   --   --   --   --  2.7* 2.8* 2.9*   < > 3.0*   BILIRUBIN  --   --   --   --   --   --   --   --   --  0.4   ALKPT  --   --   --   --   --   --   --   --   --  124*   GPT  --   --   --   --   --   --   --   --   --  43   AST  --   --   --   --   --   --   --   --   --  61*   GLUCOSE  --  237* 330* 259*   < > 244* 230* 239*   < > 130*    < > = values in this interval not displayed.      Recent Labs   Lab 10/18/19  0428 10/17/19  0452 10/16/19  1925   MG 1.8 2.0 2.1     Recent Labs   Lab 10/11/19  2225   PT 11.4   INR 1.1   PTT 45*    No results found  Recent Labs   Lab 10/12/19  0525   TSH 2.283        Arterial Blood Gas  Lab Results   Component Value Date    APH 7.32 (L) 10/16/2019    APCO2 51 (H) 10/16/2019    APO2 176 (H) 10/16/2019    AHCO3 26 10/16/2019    ASAT 98 09/19/2019        Last 24 Hour radiology reports:  No results found.    Assessment:  1. Acute on chronic hypoxic and hypercapenic respiratory failure  -Likely due to CHF and COPD exacerbations.  -  Pulse Oximetry   6 L/min (!) 89 % (10/18/19 1000)     -  ABG  Lab Results   Component Value Date    APH 7.32 (L) 10/16/2019    APO2 176 (H) 10/16/2019    ASAT 98 09/19/2019    FIO2 30 10/12/2019    AHCO3 26 10/16/2019    APCO2 51 (H) 10/16/2019      -some hypoxemia overnight with probable flash pulmonary edema now better  -blood gas overnight with a combined respiratory and metabolic acidosis     2. Severe sepsis, improving  -Urinary tract infection with klebsiella pneumoniae and concern for pneumonia  -Treated with cefepime    3. Shock   - Resolved  -sepsis versus cardiogenic  -Of pressors  -no growth from blood culture    4. Severe hyponatremia  -Stable now  -likely due to hypervolemia in the setting of decompensated congestive heart failure.  -low serum and urine osmolality.  -no seizure reported.    5. Possible healthcare associated pneumonia  -negative urine antigens for Legionella and Streptococcus.  - On Cefepime for  klebsiella    7. UTI with klebsiella pneumoniae  -Initially vancomycin and cefepime  - Vancomycin discontinued    8. Acute kidney injury, improving  -Now stable    9. Acute on chronic systolic congestive heart failure   -EF 25% per echo on 08/13/2019.  -On Lasix/spironolactone/entresto at home    10. Severe COPD with exacerbation  -On 2 L of home oxygen using nasal cannula.  -On Spiriva, Symbicort and Singulair at home.  - Bipap as tolerated    11. h/o Vfib/Vtach arrest on 08/12  -On the setting of severe cardiomyopathy   -AICD placed on 08/15/19.  -AICD was explaned on explanted on 09/19 due to infection.  -on amiodarone and metoprolol at home.  - Metoprolol restarted    12. Recent ICD pocket infection S/P AICD removal on September 19, 2019  -completed antibiotic treatment.  -VAC dressing in place    13. CAD s/p CABG  -On Toprol-XL/Lipitor/ Aspirin    -s/p Mitral valve replacement   -s/p Tricuspid valve repair   -Tobacco abuse   -Hypertension   -DM on insulin      PROPHYLAXIS:  Lovenox and famotidine    -full code.    I personally spent more than 35 minutes of critical care time reviewing labs, imaging, adjusting medications, and managing respiratory failure.    Jaylen Larson MD       patient

## 2021-02-04 NOTE — CONSULT NOTE ADULT - ASSESSMENT
As per medical team patient is not in acute withdrawals.   Medical team requested resources for the patient due to Alcohol use disorder.   CATCH team SW/ counselors notified.

## 2021-02-05 ENCOUNTER — TRANSCRIPTION ENCOUNTER (OUTPATIENT)
Age: 56
End: 2021-02-05

## 2021-02-05 LAB
A1C WITH ESTIMATED AVERAGE GLUCOSE RESULT: >15.5 % — HIGH (ref 4–5.6)
AFP-TM SERPL-MCNC: 3.5 NG/ML — SIGNIFICANT CHANGE UP
ALBUMIN SERPL ELPH-MCNC: 2.7 G/DL — LOW (ref 3.5–5.2)
ALP SERPL-CCNC: 75 U/L — SIGNIFICANT CHANGE UP (ref 30–115)
ALT FLD-CCNC: 29 U/L — SIGNIFICANT CHANGE UP (ref 0–41)
ANION GAP SERPL CALC-SCNC: 7 MMOL/L — SIGNIFICANT CHANGE UP (ref 7–14)
ANION GAP SERPL CALC-SCNC: 9 MMOL/L — SIGNIFICANT CHANGE UP (ref 7–14)
AST SERPL-CCNC: 57 U/L — HIGH (ref 0–41)
B-OH-BUTYR SERPL-SCNC: <0.2 MMOL/L — SIGNIFICANT CHANGE UP
BASOPHILS # BLD AUTO: 0.02 K/UL — SIGNIFICANT CHANGE UP (ref 0–0.2)
BASOPHILS NFR BLD AUTO: 0.6 % — SIGNIFICANT CHANGE UP (ref 0–1)
BILIRUB SERPL-MCNC: <0.2 MG/DL — SIGNIFICANT CHANGE UP (ref 0.2–1.2)
BUN SERPL-MCNC: 11 MG/DL — SIGNIFICANT CHANGE UP (ref 10–20)
BUN SERPL-MCNC: 7 MG/DL — LOW (ref 10–20)
CALCIUM SERPL-MCNC: 7.6 MG/DL — LOW (ref 8.5–10.1)
CALCIUM SERPL-MCNC: 8.4 MG/DL — LOW (ref 8.5–10.1)
CEA SERPL-MCNC: 28.5 NG/ML — HIGH (ref 0–3.8)
CHLORIDE SERPL-SCNC: 108 MMOL/L — SIGNIFICANT CHANGE UP (ref 98–110)
CHLORIDE SERPL-SCNC: 97 MMOL/L — LOW (ref 98–110)
CO2 SERPL-SCNC: 21 MMOL/L — SIGNIFICANT CHANGE UP (ref 17–32)
CO2 SERPL-SCNC: 25 MMOL/L — SIGNIFICANT CHANGE UP (ref 17–32)
CREAT SERPL-MCNC: 0.6 MG/DL — LOW (ref 0.7–1.5)
CREAT SERPL-MCNC: <0.5 MG/DL — LOW (ref 0.7–1.5)
EOSINOPHIL # BLD AUTO: 0.02 K/UL — SIGNIFICANT CHANGE UP (ref 0–0.7)
EOSINOPHIL NFR BLD AUTO: 0.6 % — SIGNIFICANT CHANGE UP (ref 0–8)
ESTIMATED AVERAGE GLUCOSE: >398 MG/DL — HIGH (ref 68–114)
GLUCOSE BLDC GLUCOMTR-MCNC: 308 MG/DL — HIGH (ref 70–99)
GLUCOSE BLDC GLUCOMTR-MCNC: 431 MG/DL — HIGH (ref 70–99)
GLUCOSE BLDC GLUCOMTR-MCNC: 558 MG/DL — CRITICAL HIGH (ref 70–99)
GLUCOSE BLDC GLUCOMTR-MCNC: >600 MG/DL — CRITICAL HIGH (ref 70–99)
GLUCOSE SERPL-MCNC: 212 MG/DL — HIGH (ref 70–99)
GLUCOSE SERPL-MCNC: 663 MG/DL — CRITICAL HIGH (ref 70–99)
HCT VFR BLD CALC: 35.7 % — LOW (ref 37–47)
HGB BLD-MCNC: 10.7 G/DL — LOW (ref 12–16)
IMM GRANULOCYTES NFR BLD AUTO: 0.9 % — HIGH (ref 0.1–0.3)
LEGIONELLA AG UR QL: NEGATIVE — SIGNIFICANT CHANGE UP
LYMPHOCYTES # BLD AUTO: 1.34 K/UL — SIGNIFICANT CHANGE UP (ref 1.2–3.4)
LYMPHOCYTES # BLD AUTO: 38.8 % — SIGNIFICANT CHANGE UP (ref 20.5–51.1)
MAGNESIUM SERPL-MCNC: 1.7 MG/DL — LOW (ref 1.8–2.4)
MCHC RBC-ENTMCNC: 25.1 PG — LOW (ref 27–31)
MCHC RBC-ENTMCNC: 30 G/DL — LOW (ref 32–37)
MCV RBC AUTO: 83.8 FL — SIGNIFICANT CHANGE UP (ref 81–99)
MONOCYTES # BLD AUTO: 0.32 K/UL — SIGNIFICANT CHANGE UP (ref 0.1–0.6)
MONOCYTES NFR BLD AUTO: 9.3 % — SIGNIFICANT CHANGE UP (ref 1.7–9.3)
NEUTROPHILS # BLD AUTO: 1.72 K/UL — SIGNIFICANT CHANGE UP (ref 1.4–6.5)
NEUTROPHILS NFR BLD AUTO: 49.8 % — SIGNIFICANT CHANGE UP (ref 42.2–75.2)
NRBC # BLD: 0 /100 WBCS — SIGNIFICANT CHANGE UP (ref 0–0)
PLATELET # BLD AUTO: 212 K/UL — SIGNIFICANT CHANGE UP (ref 130–400)
POTASSIUM SERPL-MCNC: 4.7 MMOL/L — SIGNIFICANT CHANGE UP (ref 3.5–5)
POTASSIUM SERPL-MCNC: 5.2 MMOL/L — HIGH (ref 3.5–5)
POTASSIUM SERPL-SCNC: 4.7 MMOL/L — SIGNIFICANT CHANGE UP (ref 3.5–5)
POTASSIUM SERPL-SCNC: 5.2 MMOL/L — HIGH (ref 3.5–5)
PROT SERPL-MCNC: 5.5 G/DL — LOW (ref 6–8)
RBC # BLD: 4.26 M/UL — SIGNIFICANT CHANGE UP (ref 4.2–5.4)
RBC # FLD: 15.1 % — HIGH (ref 11.5–14.5)
SODIUM SERPL-SCNC: 129 MMOL/L — LOW (ref 135–146)
SODIUM SERPL-SCNC: 138 MMOL/L — SIGNIFICANT CHANGE UP (ref 135–146)
WBC # BLD: 3.45 K/UL — LOW (ref 4.8–10.8)
WBC # FLD AUTO: 3.45 K/UL — LOW (ref 4.8–10.8)

## 2021-02-05 PROCEDURE — 99233 SBSQ HOSP IP/OBS HIGH 50: CPT

## 2021-02-05 RX ORDER — SODIUM CHLORIDE 9 MG/ML
1000 INJECTION INTRAMUSCULAR; INTRAVENOUS; SUBCUTANEOUS
Refills: 0 | Status: DISCONTINUED | OUTPATIENT
Start: 2021-02-05 | End: 2021-02-08

## 2021-02-05 RX ORDER — ALBUTEROL 90 UG/1
2 AEROSOL, METERED ORAL
Qty: 20 | Refills: 0
Start: 2021-02-05 | End: 2021-03-06

## 2021-02-05 RX ORDER — THIAMINE MONONITRATE (VIT B1) 100 MG
1 TABLET ORAL
Qty: 30 | Refills: 0
Start: 2021-02-05 | End: 2021-03-06

## 2021-02-05 RX ORDER — INSULIN LISPRO 100/ML
5 VIAL (ML) SUBCUTANEOUS ONCE
Refills: 0 | Status: COMPLETED | OUTPATIENT
Start: 2021-02-05 | End: 2021-02-05

## 2021-02-05 RX ORDER — ATORVASTATIN CALCIUM 80 MG/1
20 TABLET, FILM COATED ORAL AT BEDTIME
Refills: 0 | Status: DISCONTINUED | OUTPATIENT
Start: 2021-02-05 | End: 2021-02-09

## 2021-02-05 RX ORDER — INSULIN HUMAN 100 [IU]/ML
10 INJECTION, SOLUTION SUBCUTANEOUS ONCE
Refills: 0 | Status: COMPLETED | OUTPATIENT
Start: 2021-02-05 | End: 2021-02-05

## 2021-02-05 RX ORDER — SODIUM CHLORIDE 9 MG/ML
1000 INJECTION INTRAMUSCULAR; INTRAVENOUS; SUBCUTANEOUS
Refills: 0 | Status: DISCONTINUED | OUTPATIENT
Start: 2021-02-05 | End: 2021-02-05

## 2021-02-05 RX ORDER — LANOLIN ALCOHOL/MO/W.PET/CERES
5 CREAM (GRAM) TOPICAL AT BEDTIME
Refills: 0 | Status: DISCONTINUED | OUTPATIENT
Start: 2021-02-05 | End: 2021-02-09

## 2021-02-05 RX ORDER — ASPIRIN/CALCIUM CARB/MAGNESIUM 324 MG
81 TABLET ORAL DAILY
Refills: 0 | Status: DISCONTINUED | OUTPATIENT
Start: 2021-02-05 | End: 2021-02-09

## 2021-02-05 RX ORDER — FOLIC ACID 0.8 MG
1 TABLET ORAL
Qty: 30 | Refills: 0
Start: 2021-02-05 | End: 2021-03-06

## 2021-02-05 RX ORDER — MAGNESIUM OXIDE 400 MG ORAL TABLET 241.3 MG
400 TABLET ORAL ONCE
Refills: 0 | Status: DISCONTINUED | OUTPATIENT
Start: 2021-02-05 | End: 2021-02-09

## 2021-02-05 RX ORDER — ASPIRIN/CALCIUM CARB/MAGNESIUM 324 MG
1 TABLET ORAL
Qty: 30 | Refills: 0
Start: 2021-02-05 | End: 2021-03-06

## 2021-02-05 RX ORDER — SITAGLIPTIN AND METFORMIN HYDROCHLORIDE 500; 50 MG/1; MG/1
1 TABLET, FILM COATED ORAL
Qty: 30 | Refills: 0
Start: 2021-02-05 | End: 2021-03-06

## 2021-02-05 RX ORDER — ATORVASTATIN CALCIUM 80 MG/1
1 TABLET, FILM COATED ORAL
Qty: 30 | Refills: 0
Start: 2021-02-05 | End: 2021-03-06

## 2021-02-05 RX ORDER — INSULIN GLARGINE 100 [IU]/ML
15 INJECTION, SOLUTION SUBCUTANEOUS
Qty: 4.5 | Refills: 0
Start: 2021-02-05 | End: 2021-03-06

## 2021-02-05 RX ORDER — NICOTINE POLACRILEX 2 MG
2 GUM BUCCAL EVERY 4 HOURS
Refills: 0 | Status: DISCONTINUED | OUTPATIENT
Start: 2021-02-05 | End: 2021-02-09

## 2021-02-05 RX ADMIN — PANTOPRAZOLE SODIUM 40 MILLIGRAM(S): 20 TABLET, DELAYED RELEASE ORAL at 09:31

## 2021-02-05 RX ADMIN — ENOXAPARIN SODIUM 40 MILLIGRAM(S): 100 INJECTION SUBCUTANEOUS at 20:40

## 2021-02-05 RX ADMIN — Medication 10 MILLILITER(S): at 17:18

## 2021-02-05 RX ADMIN — Medication 10 MILLILITER(S): at 05:46

## 2021-02-05 RX ADMIN — Medication 1 TABLET(S): at 11:05

## 2021-02-05 RX ADMIN — INSULIN HUMAN 10 UNIT(S): 100 INJECTION, SOLUTION SUBCUTANEOUS at 17:20

## 2021-02-05 RX ADMIN — Medication 81 MILLIGRAM(S): at 17:17

## 2021-02-05 RX ADMIN — Medication 10 MILLILITER(S): at 09:33

## 2021-02-05 RX ADMIN — Medication 5 UNIT(S): at 23:35

## 2021-02-05 RX ADMIN — Medication 1 MILLIGRAM(S): at 11:05

## 2021-02-05 RX ADMIN — Medication 2 MILLIGRAM(S): at 17:44

## 2021-02-05 RX ADMIN — Medication 2 MILLIGRAM(S): at 18:35

## 2021-02-05 RX ADMIN — Medication 10 MILLILITER(S): at 02:39

## 2021-02-05 RX ADMIN — Medication 50 MILLIEQUIVALENT(S): at 04:00

## 2021-02-05 RX ADMIN — INSULIN HUMAN 10 UNIT(S): 100 INJECTION, SOLUTION SUBCUTANEOUS at 20:25

## 2021-02-05 RX ADMIN — Medication 100 MILLIGRAM(S): at 20:41

## 2021-02-05 RX ADMIN — ATORVASTATIN CALCIUM 20 MILLIGRAM(S): 80 TABLET, FILM COATED ORAL at 20:40

## 2021-02-05 RX ADMIN — INSULIN HUMAN 10 UNIT(S): 100 INJECTION, SOLUTION SUBCUTANEOUS at 18:55

## 2021-02-05 RX ADMIN — Medication 2 MILLIGRAM(S): at 20:42

## 2021-02-05 RX ADMIN — Medication 5 MILLIGRAM(S): at 23:35

## 2021-02-05 RX ADMIN — Medication 10 MILLILITER(S): at 14:31

## 2021-02-05 RX ADMIN — Medication 1 PATCH: at 11:04

## 2021-02-05 RX ADMIN — SODIUM CHLORIDE 100 MILLILITER(S): 9 INJECTION INTRAMUSCULAR; INTRAVENOUS; SUBCUTANEOUS at 19:00

## 2021-02-05 RX ADMIN — INSULIN GLARGINE 20 UNIT(S): 100 INJECTION, SOLUTION SUBCUTANEOUS at 20:41

## 2021-02-05 RX ADMIN — INSULIN HUMAN 10 UNIT(S): 100 INJECTION, SOLUTION SUBCUTANEOUS at 17:57

## 2021-02-05 RX ADMIN — SODIUM CHLORIDE 100 MILLILITER(S): 9 INJECTION INTRAMUSCULAR; INTRAVENOUS; SUBCUTANEOUS at 19:56

## 2021-02-05 RX ADMIN — Medication 25 MILLIGRAM(S): at 11:26

## 2021-02-05 NOTE — DISCHARGE NOTE PROVIDER - PROVIDER TOKENS
PROVIDER:[TOKEN:[[id number]],FOLLOWUP:[2 weeks]],PROVIDER:[TOKEN:[[id number]],FOLLOWUP:[2 weeks]] PROVIDER:[TOKEN:[[id number]],FOLLOWUP:[2 weeks]],PROVIDER:[TOKEN:[[id number]],FOLLOWUP:[2 weeks]],PROVIDER:[TOKEN:[[id number]],FOLLOWUP:[2 weeks]]

## 2021-02-05 NOTE — PROGRESS NOTE ADULT - ASSESSMENT
54yo female w pmhx of  HTN, DM, Liver cirrhosis, ETOH abuse (last drink yesterday 2/2) and current smoker of 1/2pack/day x 40 years who presents for a productive cough and found to be Hyperglycemic on admission.    #Uncontrolled DM w severe hyperglycemia--resolved  - On admission glucose 947, AG 15, BHB 1.8, hyponatremia (normal when correcting for glucose)  - Pt endorses not taking home DM medications  - Glucose controlled  - d/c IVF  - f/u A1c, lipid panel    #Suspected UTI in setting of DKA/HHS--resolved  #PNA ruled out  - UA w large LE and mod bacteria but no leukocytosis or fevers  - >40 years hx of smoking; CXR w hyperinflated lungs but otherwise neg  - Procal 0.05  - Cultures negative, UCx pending  - Received 3 days of abx, d/c  - Outpatient pulm follow up    #Suspected malignancy  - Given smoking hx, unintentional weight loss, symptoms of night sweats, ordered CT chest and abdomen with IV contrast to r/o lymphoma  - CEA elevated 28.5  - Patient refused CT scan despite explanation of risks of undiagnosed malignancy  - Will refer to outpt GI and [facility] for cancer screening    #Alcohol dependence disorder w suspected folate and thiamine deficiency  #h/o liver cirrhosis  - Last drink day before admission  - Not in withdrawal  - CIWA PRN  - c/w MVI, thiamine, folate  - ALP and AST mildly elevated  - f/u AFB  - CEA elevated 28.5  - RUQ US: hepatic steatosis, contracted GB  - will d/c to inpatient rehab per CATCH team    #h/o HTN (not on home meds)  - Hypotensive on admission    GI ppx: pantoprazole  DVT ppx: Lovenox  Diet: DASH  Activity: IAT      Pending inpatient rehab placement

## 2021-02-05 NOTE — PHYSICAL THERAPY INITIAL EVALUATION ADULT - PERTINENT HX OF CURRENT PROBLEM, REHAB EVAL
56yo female w pmhx of  HTN, DM, Liver cirrhosis, ETOH abuse (last drink 2/2) and current smoker of 1/2pack/day x 40 years who presents for a productive cough and found to be Hyperglycemic on admission.

## 2021-02-05 NOTE — DISCHARGE NOTE PROVIDER - NSDCFUADDINST_GEN_ALL_CORE_FT
You should follow up outpatient with a primary doctor for age-appropriate cancer screening including but not limited to: colonoscopy, pap smear, mammography, lung CT, and workup for lymphoma.

## 2021-02-05 NOTE — DISCHARGE NOTE PROVIDER - NSDCCPCAREPLAN_GEN_ALL_CORE_FT
PRINCIPAL DISCHARGE DIAGNOSIS  Diagnosis: Hyperglycemia  Assessment and Plan of Treatment: You were found to have a very high blood glucose level as a result of not taking your medications. High blood glucose level can be dangerous and lead to many complicaitons including coma and death. You need to take your medications as prescribed to prevent this from happening.      SECONDARY DISCHARGE DIAGNOSES  Diagnosis: Occult malignancy  Assessment and Plan of Treatment: Given your smoking history, unintentional weight loss, and symptoms like night sweats, we suspect you may have an underlying malignancy such as lymphoma or lung cancer. We recommended a CT scan of the chest and abdomen to investigate further. You declined to get the CT scan despite explanation of the risks of an undiagnosed malignancy. You should follow up outpatient with a primary doctor for age-appropriate cancer screening including but not limited to: colonoscopy, pap smear, mammography, lung CT, and workup for lymphoma.    Diagnosis: Vaginal candida  Assessment and Plan of Treatment: You were found to have a fungal vaginal infection and were treated with an antifungal medication.    Diagnosis: UTI (urinary tract infection)  Assessment and Plan of Treatment: You were found to have a urinary tract infection and were treated with antibiotics.     PRINCIPAL DISCHARGE DIAGNOSIS  Diagnosis: Hyperglycemia  Assessment and Plan of Treatment: You were found to have a very high blood glucose level as a result of not taking your medications. High blood glucose level can be dangerous and lead to many complicaitons including coma and death. You need to take your medications as prescribed to prevent this from happening.  We also recommend that you follow up with a primary doctor in the [facility]. An appointment was made for you on February 22 at 830 AM. Be sure to follow up to monitor your diabetes.      SECONDARY DISCHARGE DIAGNOSES  Diagnosis: Occult malignancy  Assessment and Plan of Treatment: Given your smoking history, unintentional weight loss, and symptoms like night sweats, we suspect you may have an underlying malignancy such as lymphoma or lung cancer. We recommended a CT scan of the chest and abdomen to investigate further. You declined to get the CT scan despite explanation of the risks of an undiagnosed malignancy. You should follow up outpatient with a primary doctor for age-appropriate cancer screening including but not limited to: colonoscopy, pap smear, mammography, lung CT, and workup for lymphoma.    Diagnosis: Vaginal candida  Assessment and Plan of Treatment: You were found to have a fungal vaginal infection and were treated with an antifungal medication.    Diagnosis: UTI (urinary tract infection)  Assessment and Plan of Treatment: You were found to have a urinary tract infection and were treated with antibiotics.     PRINCIPAL DISCHARGE DIAGNOSIS  Diagnosis: Hyperglycemia  Assessment and Plan of Treatment: You were found to have a very high blood glucose level as a result of not taking your medications. High blood glucose level can be dangerous and lead to many complicaitons including coma and death. You need to take your medications as prescribed to prevent this from happening.  We also recommend that you follow up with a primary doctor in the [facility]. An appointment was made for you on February 22 at 830 AM. Be sure to follow up to monitor your diabetes.      SECONDARY DISCHARGE DIAGNOSES  Diagnosis: Occult malignancy  Assessment and Plan of Treatment: Given your smoking history, unintentional weight loss, and symptoms like night sweats, we suspect you may have an underlying malignancy such as lymphoma or lung cancer. We recommended a CT scan of the chest and abdomen to investigate further. You declined to get the CT scan despite explanation of the risks of an undiagnosed malignancy. You should follow up outpatient with a primary doctor for age-appropriate cancer screening including but not limited to: colonoscopy, pap smear, mammography, lung CT, and workup for lymphoma.  You had a bone scan which was negative for disease in your bones.    Diagnosis: Vaginal candida  Assessment and Plan of Treatment: You were found to have a fungal vaginal infection and were treated with an antifungal medication.    Diagnosis: UTI (urinary tract infection)  Assessment and Plan of Treatment: You were found to have a urinary tract infection and were treated with antibiotics.

## 2021-02-05 NOTE — DIETITIAN INITIAL EVALUATION ADULT. - PERTINENT LABORATORY DATA
(2/5) H/H 10.7/35.7 (2/4) K 2.8, BUN 7, Cr <0.5, BG 36  CAPILLARY BLOOD GLUCOSE  POCT Blood Glucose.: 157 mg/dL (04 Feb 2021 21:45)  POCT Blood Glucose.: 149 mg/dL (04 Feb 2021 16:35)

## 2021-02-05 NOTE — DIETITIAN INITIAL EVALUATION ADULT. - OTHER CALCULATIONS
wt used: 45kg CBW kcal: 1344-1551kcal (MSJ x 1.3-1.5 AF)- severe PCM protein: 59-67g (1.3-1.5g/kg IBW) fluids: 1ml/kcal or per LIP

## 2021-02-05 NOTE — PHYSICAL THERAPY INITIAL EVALUATION ADULT - LEVEL OF INDEPENDENCE: STAIR NEGOTIATION, REHAB EVAL
Pt c/o mild b/l LE discomfort during stair negotiation (pt stating "the whole leg" when asked to specify) but resolved upon completion of stair training./contact guard

## 2021-02-05 NOTE — DIETITIAN INITIAL EVALUATION ADULT. - OTHER INFO
56 yo F with PMH HTN, DM, Liver cirrhosis, ETOH abuse admitted for uncontrolled DM with hyperglycemia. No A1c in chart. Pt noncompliant with DM medication. Supplemented with thiamine and folic acid.     Pt reports good appetite and po intake PTA. Does not follow any dietary restrictions. Denies use of vitamins or minerals. Drinks oral nutrition supplements three times daily, unable to clarify which one. Confirms NKFA. No Jehovah's witness or cultural food preferences. Denies difficulties chewing or swallowing.     In house appetite is at baseline, reports she's always hungry. Eats most of her meals, wants double portions. Wants Glucerna strawberry. Amenable to receive Prosourc gelatin sugar-free as well to optimize protein intake. No c/o N+V, constipation or diarrhea, last BM 2/5.     -140lbs, reports lost wt in 6 months. Unclear reason, notes was eating same amount of food daily. ~27% wt loss x 6 months     Ht:  64"   Wt:  99lbs   IBW:  120lbs +/-10%    BMI: 17  kg/m2    Skin: No pressure injuries per RN flow sheets  Edema: None noted per RN flow sheets

## 2021-02-05 NOTE — DIETITIAN INITIAL EVALUATION ADULT. - ADD RECOMMEND
1. Continue current diet order as tolerated 2. Order Glucerna TID and Prosourc gelatin sugar-free BID to optimize po intake 3. Order A1c lab value

## 2021-02-05 NOTE — DISCHARGE NOTE PROVIDER - DETAILS OF MALNUTRITION DIAGNOSIS/DIAGNOSES
This patient has been assessed with a concern for Malnutrition and was treated during this hospitalization for the following Nutrition diagnosis/diagnoses:     -  02/05/2021: Severe protein-calorie malnutrition   -  02/05/2021: Underweight (BMI < 19)   This patient has been assessed with a concern for Malnutrition and was treated during this hospitalization for the following Nutrition diagnosis/diagnoses:     -  02/05/2021: Severe protein-calorie malnutrition   -  02/05/2021: Underweight (BMI < 19)    This patient has been assessed with a concern for Malnutrition and was treated during this hospitalization for the following Nutrition diagnosis/diagnoses:     -  02/05/2021: Severe protein-calorie malnutrition   -  02/05/2021: Underweight (BMI < 19)   This patient has been assessed with a concern for Malnutrition and was treated during this hospitalization for the following Nutrition diagnosis/diagnoses:     -  02/05/2021: Severe protein-calorie malnutrition   -  02/05/2021: Underweight (BMI < 19)    This patient has been assessed with a concern for Malnutrition and was treated during this hospitalization for the following Nutrition diagnosis/diagnoses:     -  02/05/2021: Severe protein-calorie malnutrition   -  02/05/2021: Underweight (BMI < 19)    This patient has been assessed with a concern for Malnutrition and was treated during this hospitalization for the following Nutrition diagnosis/diagnoses:     -  02/05/2021: Severe protein-calorie malnutrition   -  02/05/2021: Underweight (BMI < 19)   This patient has been assessed with a concern for Malnutrition and was treated during this hospitalization for the following Nutrition diagnosis/diagnoses:     -  02/05/2021: Severe protein-calorie malnutrition   -  02/05/2021: Underweight (BMI < 19)    This patient has been assessed with a concern for Malnutrition and was treated during this hospitalization for the following Nutrition diagnosis/diagnoses:     -  02/05/2021: Severe protein-calorie malnutrition   -  02/05/2021: Underweight (BMI < 19)    This patient has been assessed with a concern for Malnutrition and was treated during this hospitalization for the following Nutrition diagnosis/diagnoses:     -  02/05/2021: Severe protein-calorie malnutrition   -  02/05/2021: Underweight (BMI < 19)    This patient has been assessed with a concern for Malnutrition and was treated during this hospitalization for the following Nutrition diagnosis/diagnoses:     -  02/05/2021: Severe protein-calorie malnutrition   -  02/05/2021: Underweight (BMI < 19)   This patient has been assessed with a concern for Malnutrition and was treated during this hospitalization for the following Nutrition diagnosis/diagnoses:     -  02/05/2021: Severe protein-calorie malnutrition   -  02/05/2021: Underweight (BMI < 19)    This patient has been assessed with a concern for Malnutrition and was treated during this hospitalization for the following Nutrition diagnosis/diagnoses:     -  02/05/2021: Severe protein-calorie malnutrition   -  02/05/2021: Underweight (BMI < 19)    This patient has been assessed with a concern for Malnutrition and was treated during this hospitalization for the following Nutrition diagnosis/diagnoses:     -  02/05/2021: Severe protein-calorie malnutrition   -  02/05/2021: Underweight (BMI < 19)    This patient has been assessed with a concern for Malnutrition and was treated during this hospitalization for the following Nutrition diagnosis/diagnoses:     -  02/05/2021: Severe protein-calorie malnutrition   -  02/05/2021: Underweight (BMI < 19)    This patient has been assessed with a concern for Malnutrition and was treated during this hospitalization for the following Nutrition diagnosis/diagnoses:     -  02/05/2021: Severe protein-calorie malnutrition   -  02/05/2021: Underweight (BMI < 19)

## 2021-02-05 NOTE — CHART NOTE - NSCHARTNOTEFT_GEN_A_CORE
1. Hyperglycemia  - Patient's Hba1c >15.5 on 02/01/21  - On 02/05: patient's blood sugars were elevated as follows:  --> 16:40 PM >600  --> 16:48 PM >600  --> 17:45 PM >600  --> 18:12 PM >600  - Patient received a total of 20 units insulin subcutaneously and 20 units insulin IV until 19:00 PM  - At around 19:27 PM, POCT was 558 so we administered IV insulin 10 units STAT at 20:30 PM  - Repeat POCT at 21:30 PM dropped to 431 so we administered 5 units of Admeolg subcutaneously STAT at 22: 30 PM  - Will continue to monitor POCT every hour  - Will discuss adding standing Lispro TID in AM in addition to Lantus 20units at night 1. Hyperglycemia  - Patient's Hba1c >15.5 on 02/01/21  - On 02/05: patient's blood sugars were elevated as follows:  --> 16:40 PM >600  --> 16:48 PM >600  --> 17:45 PM >600  --> 18:12 PM >600  - Patient received a total of 20 units insulin subcutaneously and 20 units insulin IV until 19:00 PM  - STAT BMP and BHB sent to Lab at 19:30 PM to rule out HHS came back as follows: K 5.2, AG 7, POCT 663, Na 129 on 0.9 NS 100mL/hour      - At around 19:27 PM, POCT was 558 so we administered IV insulin 10 units STAT at 20:30 PM  - Repeat POCT at 21:30 PM dropped to 431 so we administered 5 units of Admeolg subcutaneously STAT at 22: 30 PM  - Will continue to monitor POCT every hour  - Will discuss adding standing Lispro TID in AM in addition to Lantus 20units at night 1. Hyperglycemia  - Patient's Hba1c >15.5 on 02/01/21  - On 02/05: patient's blood sugars were elevated as follows:  --> 16:40 PM >600  --> 16:48 PM >600  --> 17:45 PM >600  --> 18:12 PM >600  - Patient received a total of 20 units insulin subcutaneously and 20 units insulin IV until 19:00 PM  - STAT BMP and BHB sent to Lab at 19:30 PM to rule out HHS came back as follows: K 5.2, AG 7, POCT 663, Na 129 on 0.9 NS 100mL/hour      - At around 19:27 PM, POCT was 558 so we administered IV insulin 10 units STAT at 20:30 PM  - Repeat POCT at 21:30 PM dropped to 431 so we administered 5 units of Admeolg subcutaneously STAT at 22: 30 PM  - Will continue to monitor POCT every hour  - At 5 AM, POCT 178  - Patient had fall while attempting to stand and go to bathroom  - Patient denies head trauma during episode and light headedness, chest pain, diaphoresis, or palpitations prior to the episode  - After the fall, patient was walking again   - She had no complaints   - On exam: neurologically intact so won't order CT head or imaging

## 2021-02-05 NOTE — DISCHARGE NOTE PROVIDER - CARE PROVIDERS DIRECT ADDRESSES
,DirectAddress_Unknown,[email or web address] ,DirectAddress_Unknown,[email or web address]

## 2021-02-05 NOTE — PHYSICAL THERAPY INITIAL EVALUATION ADULT - THERAPY FREQUENCY, PT EVAL
Pt can cont amb with NSG as needed and does not require skilled b/s PT intervention at this time. Pt ambulating on unit without AD with supervision, and transfers independently. Please recall PT services as needed if situation changes.

## 2021-02-05 NOTE — DISCHARGE NOTE PROVIDER - NSDCFUADDAPPT_GEN_ALL_CORE_FT
[facility] clinic appointment scheduled on February 22 at 8:30AM.   [address], [address]  [phone]

## 2021-02-05 NOTE — DISCHARGE NOTE PROVIDER - HOSPITAL COURSE
56yo female w pmhx of HTN, DM, Liver cirrhosis, ETOH abuse (last drink day before admission 2/2) and current smoker of 1/2 pack/day x 40 years who presented to ED with complaints of cough. She also mentioned significant unintentional weight loss. She had not been taking any of her outpatient medications due to financial and insurance issues.    In the ED, labs were significant for Glucose 947, Na 125 (wnl when corrected for hyperglycemia), AG 15, BHB 1.8; UA w large LE and mod bacteria.    The patient was admitted for management of severe hyperglycemia. She received IV insulin and was started on sq insulin (was not on a drip) with resolution of the hyperglycemia. Liver enzymes were slightly elevated; RUQ US only showed hepatic steatosis and contracted GB. Given the weight loss, symptoms of night sweats, cough, and smoking history, CT chest and abdomen with IV contrast was ordered to r/o lymphoma or lung malignancy. The patient refused the test and pulled out her IV multiple times. The patient was evaluated by CATCH team and she agreed for inpatient alcohol rehab. She is medically stable for discharge. 54yo female w pmhx of HTN, DM, Liver cirrhosis, ETOH abuse (last drink day before admission 2/2) and current smoker of 1/2 pack/day x 40 years who presented to ED with complaints of cough. She also mentioned significant unintentional weight loss. She had not been taking any of her outpatient medications due to financial and insurance issues.    In the ED, labs were significant for Glucose 947, Na 125 (wnl when corrected for hyperglycemia), AG 15, BHB 1.8; UA w large LE and mod bacteria.    The patient was admitted for management of severe hyperglycemia. She received IV insulin and was started on sq insulin (was not on a drip) with resolution of the hyperglycemia. Liver enzymes were slightly elevated; RUQ US only showed hepatic steatosis and contracted GB. Given the weight loss, symptoms of night sweats, cough, and smoking history, CT chest and abdomen with IV contrast was ordered to r/o lymphoma or lung malignancy. The patient refused the test. CEA was found to be elevated. She will be referred to f/u outpatient with GI. The patient was evaluated by CATCH team and she agreed for inpatient alcohol rehab. She is medically stable for discharge. 54yo female w pmhx of HTN, DM, Liver cirrhosis, ETOH abuse (last drink day before admission 2/2) and current smoker of 1/2 pack/day x 40 years who presented to ED with complaints of cough. She also mentioned significant unintentional weight loss. She had not been taking any of her outpatient medications due to financial and insurance issues.  In the ED, labs were significant for Glucose 947, Na 125 (wnl when corrected for hyperglycemia), AG 15, BHB 1.8; UA w large LE and mod bacteria.  The patient was admitted for management of severe hyperglycemia. She received IV insulin and was started on sq insulin (was not on a drip) with resolution of the hyperglycemia. Liver enzymes were slightly elevated; RUQ US only showed hepatic steatosis and contracted GB. Given the weight loss, symptoms of night sweats, cough, and smoking history, CT chest and abdomen with IV contrast was ordered to r/o lymphoma or lung malignancy. The patient refused the test. CEA was found to be elevated. She will be referred to f/u outpatient with GI. Pt stay was complicated by multiple mechanical falls, no HT, no LOC, CTH negative, trauma work up revealed R 2nd rib fx suspicious for metastatic disease.  The patient was evaluated by CATCH team and she agreed for inpatient alcohol rehab. Is not displaying evidence of alcohol withdrawal. She is medically stable for discharge. 56yo female w pmhx of HTN, DM, Liver cirrhosis, ETOH abuse (last drink day before admission 2/2) and current smoker of 1/2 pack/day x 40 years who presented to ED with complaints of cough. She also mentioned significant unintentional weight loss. She had not been taking any of her outpatient medications due to financial and insurance issues.  In the ED, labs were significant for Glucose 947, Na 125 (wnl when corrected for hyperglycemia), AG 15, BHB 1.8; UA w large LE and mod bacteria.  The patient was admitted for management of severe hyperglycemia. She received IV insulin and was started on sq insulin (was not on a drip) with resolution of the hyperglycemia. Liver enzymes were slightly elevated; RUQ US only showed hepatic steatosis and contracted GB. Given the weight loss, symptoms of night sweats, cough, and smoking history, CT chest and abdomen with IV contrast was ordered to r/o lymphoma or lung malignancy. The patient refused the test. CEA was found to be elevated. She will be referred to f/u outpatient with GI. Pt stay was complicated by multiple mechanical falls, no HT, no LOC, CTH negative, trauma work up revealed R 2nd rib fx suspicious for metastatic disease. Bone scan negative for evidence of metastatic disease.   The patient was evaluated by CATCH team and she agreed for inpatient alcohol rehab. Is not displaying evidence of alcohol withdrawal. She is medically stable for discharge.

## 2021-02-05 NOTE — DIETITIAN INITIAL EVALUATION ADULT. - PERSON TAUGHT/METHOD
Reinforced carbohydrate consistent diet recommendations. Encouraged pt have meals that contain healthy fat, lean protein, and consistent carbohydrate. Discussed sources of carbohydrate. Encouraged pt to avoid processed foods and sweetened beverages. Provided outpatient nutrition management resource information./verbal instruction/written material/patient instructed

## 2021-02-05 NOTE — DISCHARGE NOTE PROVIDER - CARE PROVIDER_API CALL
Rayshawn Steiner)  Critical Care Medicine; Pulmonary Disease; Sleep Medicine  [address] [place] [address]  Phone: [phone]  Fax: [phone]  Follow Up Time: 2 weeks    Sukhdeep Andino)  Internal Medicine  [address] [facility], [facility] [address]  [place] [address]  Phone: [phone]  Fax: [phone]  Follow Up Time: 2 weeks   Rayshawn Steiner)  Critical Care Medicine; Pulmonary Disease; Sleep Medicine  [address] [place] [address]  Phone: [phone]  Fax: [phone]  Follow Up Time: 2 weeks    Sukhdeep Andino)  Internal Medicine  [address] [facility], [facility] [address]  [place] [address]  Phone: [phone]  Fax: [phone]  Follow Up Time: 2 weeks    Kerry Matta)  Gastroenterology  [address] [place] [address]  Phone: [phone]  Fax: [phone]  Follow Up Time: 2 weeks

## 2021-02-05 NOTE — PHYSICAL THERAPY INITIAL EVALUATION ADULT - LEVEL OF INDEPENDENCE: GAIT, REHAB EVAL
Pt with intermittent veering to L and R however pt able to self correct pathing, pt occasionally reaches for wall for additional support as needed/supervision

## 2021-02-05 NOTE — DIETITIAN NUTRITION RISK NOTIFICATION - TREATMENT: THE FOLLOWING DIET HAS BEEN RECOMMENDED
Diet, DASH/TLC:   Sodium & Cholesterol Restricted  Consistent Carbohydrate {Evening Snack}     Qty per Day:  STRAWBERRY  Pro[employer] Gelatein 20 Sugar Free     Qty per Day:  2  Supplement Feeding Modality:  Oral  Glucerna Shake Cans or Servings Per Day:  1       Frequency:  Three Times a day (02-05-21 @ 12:41) [Pending Verification By Attending]  Diet, DASH/TLC:   Sodium & Cholesterol Restricted  Consistent Carbohydrate {No Snacks} (02-03-21 @ 15:23) [Active]

## 2021-02-05 NOTE — PHYSICAL THERAPY INITIAL EVALUATION ADULT - GENERAL OBSERVATIONS, REHAB EVAL
1511 - 1545 Pt rec supine in bed in NAD. Pt agreeable to b/s PT; Pt can cont amb with NSG as needed and does not require skilled b/s PT intervention at this time. Pt ambulating on unit without AD with supervision, and transfers independently. Please recall PT services as needed if situation changes.

## 2021-02-05 NOTE — PROGRESS NOTE ADULT - ASSESSMENT
54yo female, noncompliant with medications and doctors follow up, who recently moved from [place], [facility] significant for HTN, DM2 (not compliant with Lantus), liver cirrhosis, ETOH dependence (last drink on 2/2), chronic smoker, 1/2 pack a day for >40 years, presented to ED with complaints of generalized weakness, decreased appetite, cough and burning urination with vaginal itching. Cough is chronic, associated with yellowish sputum. Pt reports she lost ~50lbs in last 3 months unintentionally.     # HHS. DM2 with hyperglycemia likely due to noncompliance now with hypoglycemia, but asymptomatic.  - started on lantus 20 units QHS  - CBG at goal  - f/u HbA1C, still pending  - pt will benefit from combination oral meds like Janumet and Insulin  - she will need glucometer, lancets, strips and alcohol swabs to be send to her pharmacy  - pt will benefit from ASA and Statins for CAD ppx     # Weight loss, might be due to uncontrolled DM, rule out malignancy, pt is a chronic heavy smoker, not up to date with age appropriate cancer screening. States she had mammogram done 3 years ago, no PAP smear for many years, colonoscopy many years ago. +B symptoms, +anemia.  - pt refused CT scans  - advised to establish care with PMD and get age appropriate screening and further work up, pt expressed understanding   - AFP 3.5, CEA 28 (elevated), iron studies pending  - pt will need GI OP for colonoscopy    # Hypokalemia - supplemented, resolved     # Hyponatremia due to hypoglycemia - resolved    # Suspected UTI - UA positive for LE and bacteria with WBC, currently on Rocephin, can DC ABx on discharge, uncomplicated UTI 3 days of IV ABx would be sufficient.     # Vulvovaginal candidiasis - given one dose of Diflucan    # Alcohol dependence with abuse - no signs of withdrawal, monitor CIWA, Ativan PRN. Please check phosphorous.  - CATCH team eval appreciated, Pt to be discharged to inpatient rehab     # Liver cirrhosis - LFT's mildly elevated AST, no signs of ascites, normal Plt, normal creatinine and INR.    # Nicotine dependence - smoking cessation counselling provided.  Pt does not want nicotine patch, she prefers nicotine gum.      Pt can be discharged today if OP inpatient rehab arranged.      CHIEF COMPLAINT:    Chief Complaint   Patient presents with   • Cough       HISTORY OF PRESENT ILLNESS:  Lacy Rao is a 2 month old male who presents with a 2 day(s) h/o congestion and cough.     Associated symptoms;      Otalgia - absent      Otorrhea - absent      Rhinorrhea - congestion      Fever - none    INTAKE/OUTPUT;  Slightly decreased po intake; Normal urinary output    Other symptoms;  reviewed and accepted nurse's note  Recent illnesses;had purulent rhinitis and took antibiotic for 2 weeks and better.  Sick contacts; none known     History reviewed. No pertinent past medical history.    Patient Active Problem List   Diagnosis   • Scalp lesion     History reviewed. No pertinent family history.  Medications and allergies reviewed with patient.    EXAM:  Vital signs -   Visit Vitals  Pulse 120   Temp (!) 99.9 °F (37.7 °C) (Rectal)   Resp 40   Wt 4.99 kg     General - alert, active, comfortable, not toxic, and in no acute distress  HEENT - Eyes - conjunctiva and sclera are not inflamed          Ears - external ears - normal.  canals - clear.  TM's - normal          Nose - clear rhinorrhea          Throat - moist mucous membranes, +2 tonsils w/o erythema, exudates or petechia  Neck - supple and small, benign anterior cervical nodes bilaterally  Lungs - Clear to auscultation,some coarse breath sounds no wheezing, crackles or retractions  Heart - Regular rate and rhythm w/o murmurs  Skin - Normal, no lesions, rash or ecchymosis    ASSESSMENT AND PLAN:  See diagnosis, orders/medications, and follow-up instructions.  Bronchiolitis--in no distress and taking liquids well.  Recommend push liquids, humidifier and call if any respiratory distress and/or difficulty feeding.  Gave handout on bronchiolitis.  Call if no better in 48 hours or if worsens.    The parents were counseled about each component of the vaccines, including possible side effects, risks and benefits.  Recommend immunizations for age.  Gave  vaccine information statement handouts on immunizations.  If [facility] or [facility], family to send or drop off records.  Also, if new patient and does not have previous immunization records, encouraged to send to us.  If patient refuses immunization(s), did review again and that American Academy of Pediatrics and Advisory Committee on Immunization Practices recommends all children get immunized.

## 2021-02-06 LAB
BASOPHILS # BLD AUTO: 0.03 K/UL — SIGNIFICANT CHANGE UP (ref 0–0.2)
BASOPHILS NFR BLD AUTO: 0.8 % — SIGNIFICANT CHANGE UP (ref 0–1)
CULTURE RESULTS: SIGNIFICANT CHANGE UP
EOSINOPHIL # BLD AUTO: 0.03 K/UL — SIGNIFICANT CHANGE UP (ref 0–0.7)
EOSINOPHIL NFR BLD AUTO: 0.8 % — SIGNIFICANT CHANGE UP (ref 0–8)
FUNGITELL: 190 PG/ML — HIGH
GLUCOSE BLDC GLUCOMTR-MCNC: 174 MG/DL — HIGH (ref 70–99)
GLUCOSE BLDC GLUCOMTR-MCNC: 182 MG/DL — HIGH (ref 70–99)
GLUCOSE BLDC GLUCOMTR-MCNC: 187 MG/DL — HIGH (ref 70–99)
GLUCOSE BLDC GLUCOMTR-MCNC: 217 MG/DL — HIGH (ref 70–99)
GLUCOSE BLDC GLUCOMTR-MCNC: 257 MG/DL — HIGH (ref 70–99)
GLUCOSE BLDC GLUCOMTR-MCNC: 336 MG/DL — HIGH (ref 70–99)
GLUCOSE BLDC GLUCOMTR-MCNC: 47 MG/DL — CRITICAL LOW (ref 70–99)
HCT VFR BLD CALC: 38.3 % — SIGNIFICANT CHANGE UP (ref 37–47)
HGB BLD-MCNC: 11.2 G/DL — LOW (ref 12–16)
IMM GRANULOCYTES NFR BLD AUTO: 0.8 % — HIGH (ref 0.1–0.3)
LYMPHOCYTES # BLD AUTO: 2.2 K/UL — SIGNIFICANT CHANGE UP (ref 1.2–3.4)
LYMPHOCYTES # BLD AUTO: 55.4 % — HIGH (ref 20.5–51.1)
MCHC RBC-ENTMCNC: 25.2 PG — LOW (ref 27–31)
MCHC RBC-ENTMCNC: 29.2 G/DL — LOW (ref 32–37)
MCV RBC AUTO: 86.3 FL — SIGNIFICANT CHANGE UP (ref 81–99)
MONOCYTES # BLD AUTO: 0.6 K/UL — SIGNIFICANT CHANGE UP (ref 0.1–0.6)
MONOCYTES NFR BLD AUTO: 15.1 % — HIGH (ref 1.7–9.3)
NEUTROPHILS # BLD AUTO: 1.08 K/UL — LOW (ref 1.4–6.5)
NEUTROPHILS NFR BLD AUTO: 27.1 % — LOW (ref 42.2–75.2)
NRBC # BLD: 0 /100 WBCS — SIGNIFICANT CHANGE UP (ref 0–0)
PLATELET # BLD AUTO: 224 K/UL — SIGNIFICANT CHANGE UP (ref 130–400)
RBC # BLD: 4.44 M/UL — SIGNIFICANT CHANGE UP (ref 4.2–5.4)
RBC # FLD: 15.5 % — HIGH (ref 11.5–14.5)
SPECIMEN SOURCE: SIGNIFICANT CHANGE UP
WBC # BLD: 3.97 K/UL — LOW (ref 4.8–10.8)
WBC # FLD AUTO: 3.97 K/UL — LOW (ref 4.8–10.8)

## 2021-02-06 PROCEDURE — 73070 X-RAY EXAM OF ELBOW: CPT | Mod: 26,LT

## 2021-02-06 PROCEDURE — 99233 SBSQ HOSP IP/OBS HIGH 50: CPT

## 2021-02-06 PROCEDURE — 70450 CT HEAD/BRAIN W/O DYE: CPT | Mod: 26

## 2021-02-06 PROCEDURE — 73030 X-RAY EXAM OF SHOULDER: CPT | Mod: 26,LT

## 2021-02-06 PROCEDURE — 73060 X-RAY EXAM OF HUMERUS: CPT | Mod: 26,LT

## 2021-02-06 PROCEDURE — 73501 X-RAY EXAM HIP UNI 1 VIEW: CPT | Mod: 26,LT

## 2021-02-06 PROCEDURE — 73090 X-RAY EXAM OF FOREARM: CPT | Mod: 26,LT

## 2021-02-06 RX ORDER — ONDANSETRON 8 MG/1
4 TABLET, FILM COATED ORAL ONCE
Refills: 0 | Status: DISCONTINUED | OUTPATIENT
Start: 2021-02-06 | End: 2021-02-09

## 2021-02-06 RX ORDER — SODIUM CHLORIDE 9 MG/ML
1000 INJECTION, SOLUTION INTRAVENOUS
Refills: 0 | Status: DISCONTINUED | OUTPATIENT
Start: 2021-02-06 | End: 2021-02-09

## 2021-02-06 RX ORDER — SITAGLIPTIN AND METFORMIN HYDROCHLORIDE 500; 50 MG/1; MG/1
1 TABLET, FILM COATED ORAL
Qty: 30 | Refills: 0
Start: 2021-02-06 | End: 2021-03-07

## 2021-02-06 RX ORDER — DEXTROSE 50 % IN WATER 50 %
15 SYRINGE (ML) INTRAVENOUS ONCE
Refills: 0 | Status: DISCONTINUED | OUTPATIENT
Start: 2021-02-06 | End: 2021-02-09

## 2021-02-06 RX ORDER — INSULIN LISPRO 100/ML
10 VIAL (ML) SUBCUTANEOUS
Refills: 0 | Status: DISCONTINUED | OUTPATIENT
Start: 2021-02-06 | End: 2021-02-09

## 2021-02-06 RX ORDER — INSULIN LISPRO 100/ML
VIAL (ML) SUBCUTANEOUS
Refills: 0 | Status: DISCONTINUED | OUTPATIENT
Start: 2021-02-06 | End: 2021-02-09

## 2021-02-06 RX ORDER — ACETAMINOPHEN 500 MG
650 TABLET ORAL EVERY 6 HOURS
Refills: 0 | Status: DISCONTINUED | OUTPATIENT
Start: 2021-02-06 | End: 2021-02-09

## 2021-02-06 RX ADMIN — Medication 2 MILLIGRAM(S): at 11:02

## 2021-02-06 RX ADMIN — Medication 2 MILLIGRAM(S): at 01:29

## 2021-02-06 RX ADMIN — Medication 2 MILLIGRAM(S): at 04:46

## 2021-02-06 RX ADMIN — Medication 4: at 11:07

## 2021-02-06 RX ADMIN — Medication 10 UNIT(S): at 17:08

## 2021-02-06 RX ADMIN — Medication 2 MILLIGRAM(S): at 17:09

## 2021-02-06 RX ADMIN — INSULIN GLARGINE 20 UNIT(S): 100 INJECTION, SOLUTION SUBCUTANEOUS at 22:49

## 2021-02-06 RX ADMIN — Medication 100 MILLIGRAM(S): at 22:49

## 2021-02-06 RX ADMIN — Medication 1 MILLIGRAM(S): at 22:48

## 2021-02-06 RX ADMIN — Medication 2 MILLIGRAM(S): at 13:16

## 2021-02-06 RX ADMIN — Medication 10 UNIT(S): at 11:29

## 2021-02-06 RX ADMIN — Medication 3: at 17:09

## 2021-02-06 RX ADMIN — ATORVASTATIN CALCIUM 20 MILLIGRAM(S): 80 TABLET, FILM COATED ORAL at 22:49

## 2021-02-06 RX ADMIN — Medication 1 TABLET(S): at 11:02

## 2021-02-06 RX ADMIN — Medication 81 MILLIGRAM(S): at 11:02

## 2021-02-06 RX ADMIN — Medication 2 MILLIGRAM(S): at 22:50

## 2021-02-06 RX ADMIN — ENOXAPARIN SODIUM 40 MILLIGRAM(S): 100 INJECTION SUBCUTANEOUS at 22:49

## 2021-02-06 RX ADMIN — PANTOPRAZOLE SODIUM 40 MILLIGRAM(S): 20 TABLET, DELAYED RELEASE ORAL at 04:46

## 2021-02-06 RX ADMIN — Medication 1 MILLIGRAM(S): at 11:02

## 2021-02-06 RX ADMIN — Medication 5 MILLIGRAM(S): at 22:49

## 2021-02-06 NOTE — PROGRESS NOTE ADULT - ASSESSMENT
54yo female, noncompliant with medications and doctors follow up, who recently moved from [place], [facility] significant for HTN, DM2 (not compliant with Lantus), liver cirrhosis, ETOH dependence (last drink on 2/2), chronic smoker, 1/2 pack a day for >40 years, presented to ED with complaints of generalized weakness, decreased appetite, cough and burning urination with vaginal itching. Cough is chronic, associated with yellowish sputum. Pt reports she lost ~50lbs in last 3 months unintentionally.     # HHS. DM2 with hyperglycemia likely due to noncompliance.  - continue lantus 20 units QHS  - add prandial Lispro 10 units TID with meals  - f/u HbA1C >15.5  - pt will benefit from combination oral meds like Janumet and Insulin  - she will need glucometer, lancets, strips and alcohol swabs to be send to her pharmacy  - pt will benefit from ASA and Statins for CAD ppx, added     # Weight loss, might be due to uncontrolled DM, rule out malignancy, pt is a chronic heavy smoker, not up to date with age appropriate cancer screening. States she had mammogram done 3 years ago, no PAP smear for many years, colonoscopy many years ago. +B symptoms, +anemia.  - pt refused CT scans  - advised to establish care with PMD and get age appropriate screening and further work up, pt expressed understanding   - AFP 3.5, CEA 28 (elevated), iron studies pending  - pt will need GI OP for colonoscopy    # Chronic cough - pt states cough is chronic, no changes in intensity or change in sputum color, no evidence of PNA, doubt TB, most likely due to chronic smoking, ?bronchiectasis.  - pt refused CT chest, will do it OP  - Quantiferon sent    # Hypokalemia - supplemented, resolved     # Hyponatremia due to hypoglycemia - resolved    # UTI ruled out, UCX 10-49 yeast  - no need for ABx  - pt was alredy treated for vulvovaginal candidiasis     # Vulvovaginal candidiasis - given one dose of Diflucan    # Alcohol dependence with abuse - no signs of withdrawal, monitor CIWA, Ativan PRN.   - CATCH team eval appreciated, Pt to be discharged to inpatient rehab     # Liver cirrhosis - LFT's mildly elevated AST, no signs of ascites, normal Plt, normal creatinine and INR.    # Nicotine dependence - smoking cessation counselling provided on admission.  Pt does not want nicotine patch, she prefers nicotine gum.      # Suspected Thiamine and Folate deficiency - continue supplement     Inpatient alcohol rehab requested ID clearance, I called medical director to discuss the case @ [phone], but no response, I left voice message with my call back phone number.   Meanwhile ID called.

## 2021-02-06 NOTE — CHART NOTE - NSCHARTNOTEFT_GEN_A_CORE
Patient fell while going to the bathroom and hit her head on the left and left arm. She complains of pain on the left side of the head and left arm pain and tenderness. Neuro exam is normal, there is minimal tenderness on left side of the head, elbow, arm, and forearm. Will order CTH and xray of LUE. Will also check orthostatic vitals.

## 2021-02-06 NOTE — CONSULT NOTE ADULT - SUBJECTIVE AND OBJECTIVE BOX
DUKE MARTÍNEZ  55y, Female  Allergy: No Known Allergies      CHIEF COMPLAINT:   Cough (06 Feb 2021 10:53)      LOS  3d    HPI  HPI:  54yo female w pmhx of  HTN, DM, Liver cirrhosis, ETOH abuse (last drink yesterday 2/2) and current smoker of 1/2pack/day x 40 years who  presents to ED with complaints of cough. Pt states cough has been on going and recurrent for a few months, productive with yellow sputum. Patient states that she recently moved back to [place] 2 weeks ago after spending the last 20+ years in [place] and the cough has been progressively gotten worse since returning. Pt also endorses having >50lb unintentional weight loss over the past few months. Patient confirms past medical history but says has not taken any medications d/t financial issues. ROS positive for dizziness, chills, chest and belly pain, leg pain/tingling, and urinary/bowel incontinence.     In ED, T97.8, BP99/75, HR97, RR20. Labs are significant for Na 125, Glucose 947, AG 15, BHB 1.8; UA w large LE and mod bacteria. COVID neg. CXR neg.  (03 Feb 2021 12:54)      INFECTIOUS DISEASE HISTORY:  Plan for rehab, ID consulted to "rule out communicable diseases"      PMH  PAST MEDICAL & SURGICAL HISTORY:  Liver cirrhosis    Diabetes mellitus    Hypertension        FAMILY HISTORY      SOCIAL HISTORY  Social History:  Patient endorses being a current smoker for >40 years (1/2 ppd); she consumes alcohol daily - unable to quantify how much (last drink yesterday); denies illicit drug use. Patient moved back to [place] 2 weeks ago after spending 20+ years in [place]. (03 Feb 2021 12:54)        ROS  ***    VITALS:  T(F): 95.9, Max: 97.5 (02-05-21 @ 16:00)  HR: 75  BP: 93/67  RR: 18Vital Signs Last 24 Hrs  T(C): 35.5 (06 Feb 2021 08:00), Max: 36.4 (05 Feb 2021 16:00)  T(F): 95.9 (06 Feb 2021 08:00), Max: 97.5 (05 Feb 2021 16:00)  HR: 75 (06 Feb 2021 08:00) (75 - 100)  BP: 93/67 (06 Feb 2021 08:00) (87/51 - 97/67)  BP(mean): --  RR: 18 (06 Feb 2021 08:00) (18 - 19)  SpO2: 97% (05 Feb 2021 16:00) (97% - 97%)    PHYSICAL EXAM:  ***    TESTS & MEASUREMENTS:                        11.2   3.97  )-----------( 224      ( 06 Feb 2021 04:30 )             38.3     02-05    129<L>  |  97<L>  |  11  ----------------------------<  663<HH>  5.2<H>   |  25  |  0.6<L>    Ca    8.4<L>      05 Feb 2021 18:58  Mg     1.7     02-05    TPro  5.5<L>  /  Alb  2.7<L>  /  TBili  <0.2  /  DBili  x   /  AST  57<H>  /  ALT  29  /  AlkPhos  75  02-05    eGFR if Non African American: 103 mL/min/1.73M2 (02-05-21 @ 18:58)  eGFR if African American: 119 mL/min/1.73M2 (02-05-21 @ 18:58)    LIVER FUNCTIONS - ( 05 Feb 2021 05:46 )  Alb: 2.7 g/dL / Pro: 5.5 g/dL / ALK PHOS: 75 U/L / ALT: 29 U/L / AST: 57 U/L / GGT: x               Culture - Urine (collected 02-04-21 @ 19:40)  Source: .Urine Clean Catch (Midstream)  Preliminary Report (02-05-21 @ 18:47):    10,000 - 49,000 CFU/mL Yeast like cells    Culture - Blood (collected 02-03-21 @ 16:55)  Source: .Blood Blood  Preliminary Report (02-05-21 @ 01:02):    No growth to date.        Blood Gas Venous - Lactate: 1.3 mmoL/L (02-03-21 @ 07:37)      INFECTIOUS DISEASES TESTING  Legionella Antigen, Urine: Negative (02-04-21 @ 19:40)  Procalcitonin, Serum: 0.05 ng/mL (02-03-21 @ 16:57)  Fungitell: 190 pg/mL (02-03-21 @ 16:57)  COVID-19 PCR: NotDetec (02-03-21 @ 04:44)      INFLAMMATORY MARKERS  C-Reactive Protein, Serum: 0.23 mg/dL (02-03-21 @ 16:57)      RADIOLOGY & ADDITIONAL TESTS:  I have personally reviewed the last Chest xray  CXR      CT      CARDIOLOGY TESTING  12 Lead ECG:   Ventricular Rate 73 BPM    Atrial Rate 73 BPM    P-R Interval 120 ms    QRS Duration 82 ms    Q-T Interval 420 ms    QTC Calculation(Bazett) 462 ms    P Axis 73 degrees    R Axis 84 degrees    T Axis 82 degrees    Diagnosis Line Normal sinus rhythm  Normal ECG    Confirmed by Melecio Paulino ([id number]) on 2/4/2021 11:52:25 AM (02-04-21 @ 09:47)  12 Lead ECG:   Ventricular Rate 85 BPM    Atrial Rate 85 BPM    P-R Interval 136 ms    QRS Duration 86 ms    Q-T Interval 386 ms    QTC Calculation(Bazett) 459 ms    P Axis 83 degrees    R Axis 77 degrees    T Axis 79 degrees    Diagnosis Line Normal sinus rhythm  Biatrial enlargement  Abnormal ECG    Confirmed by Willard Thomas ([id number]) on 2/3/2021 1:44:37 PM (02-03-21 @ 05:53)      MEDICATIONS  aspirin  chewable 81 Oral daily  atorvastatin 20 Oral at bedtime  chlorhexidine 4% Liquid 1 Topical <User Schedule>  dextrose 40% Gel 15 Oral once  dextrose 40% Gel 15 Oral once  dextrose 5%. 1000 IV Continuous <Continuous>  dextrose 5%. 1000 IV Continuous <Continuous>  dextrose 5%. 1000 IV Continuous <Continuous>  dextrose 50% Injectable 25 IV Push once  dextrose 50% Injectable 12.5 IV Push once  dextrose 50% Injectable 25 IV Push once  enoxaparin Injectable 40 SubCutaneous at bedtime  folic acid 1 Oral daily  glucagon  Injectable 1 IntraMuscular once  insulin glargine Injectable (LANTUS) 20 SubCutaneous at bedtime  insulin lispro (ADMELOG) corrective regimen sliding scale  SubCutaneous three times a day before meals  insulin lispro Injectable (ADMELOG) 10 SubCutaneous three times a day before meals  LORazepam     Tablet 1 Oral every 8 hours  magnesium oxide 400 Oral once  melatonin 5 Oral at bedtime  multivitamin 1 Oral daily  nicotine  Polacrilex Gum 2 Oral every 4 hours  pantoprazole    Tablet 40 Oral before breakfast  sodium chloride 0.9%. 1000 IV Continuous <Continuous>  thiamine 100 Oral at bedtime      Weight  Weight (kg): 45 (02-03-21 @ 04:55)    ANTIBIOTICS:      ALLERGIES:  No Known Allergies         DUKE MARTÍNEZ  55y, Female  Allergy: No Known Allergies      CHIEF COMPLAINT:   Cough (06 Feb 2021 10:53)      LOS  3d    HPI  HPI:  56yo female w pmhx of  HTN, DM, Liver cirrhosis, ETOH abuse (last drink yesterday 2/2) and current smoker of 1/2pack/day x 40 years who  presents to ED with complaints of cough. Pt states cough has been on going and recurrent for a few months, productive with yellow sputum. Patient states that she recently moved back to [place] 2 weeks ago after spending the last 20+ years in [place] and the cough has been progressively gotten worse since returning. Pt also endorses having >50lb unintentional weight loss over the past few months. Patient confirms past medical history but says has not taken any medications d/t financial issues. ROS positive for dizziness, chills, chest and belly pain, leg pain/tingling, and urinary/bowel incontinence.     In ED, T97.8, BP99/75, HR97, RR20. Labs are significant for Na 125, Glucose 947, AG 15, BHB 1.8; UA w large LE and mod bacteria. COVID neg. CXR neg.  (03 Feb 2021 12:54)      INFECTIOUS DISEASE HISTORY:  Plan for rehab, ID consulted to "rule out communicable diseases"  Pt reports chronic cough, smoker cough, she does not believe it has gotten worse  Denies fever, chills, hemoptysis  No FRAUSTO  +Wt loss     PMH  PAST MEDICAL & SURGICAL HISTORY:  Liver cirrhosis    Diabetes mellitus    Hypertension        FAMILY HISTORY  non-contributory     SOCIAL HISTORY  Social History:  Patient endorses being a current smoker for >40 years (1/2 ppd); she consumes alcohol daily - unable to quantify how much (last drink yesterday); denies illicit drug use. Patient moved back to [place] 2 weeks ago after spending 20+ years in [place]. (03 Feb 2021 12:54)        ROS  General: Denies rigors, nightsweats  HEENT: Denies headache, rhinorrhea, sore throat, eye pain  CV: Denies CP, palpitations  PULM: as noted above   GI: Denies hematemesis, hematochezia, melena  : Denies discharge, hematuria  MSK: Denies arthralgias, myalgias  SKIN: Denies rash, lesions  NEURO: Denies paresthesias, weakness  PSYCH: Denies depression, anxiety     VITALS:  T(F): 95.9, Max: 97.5 (02-05-21 @ 16:00)  HR: 75  BP: 93/67  RR: 18Vital Signs Last 24 Hrs  T(C): 35.5 (06 Feb 2021 08:00), Max: 36.4 (05 Feb 2021 16:00)  T(F): 95.9 (06 Feb 2021 08:00), Max: 97.5 (05 Feb 2021 16:00)  HR: 75 (06 Feb 2021 08:00) (75 - 100)  BP: 93/67 (06 Feb 2021 08:00) (87/51 - 97/67)  BP(mean): --  RR: 18 (06 Feb 2021 08:00) (18 - 19)  SpO2: 97% (05 Feb 2021 16:00) (97% - 97%)    PHYSICAL EXAM:  Gen: very thin appearing  HEENT: Normocephalic, atraumatic  Neck: supple, no lymphadenopathy  CV: Regular rate & regular rhythm  Lungs: decreased BS at bases, no fremitus  Abdomen: Soft, BS present  Ext: Warm, well perfused  Neuro: non focal, awake  Skin: no rash, no erythema  Lines: no phlebitis     TESTS & MEASUREMENTS:                        11.2   3.97  )-----------( 224      ( 06 Feb 2021 04:30 )             38.3     02-05    129<L>  |  97<L>  |  11  ----------------------------<  663<HH>  5.2<H>   |  25  |  0.6<L>    Ca    8.4<L>      05 Feb 2021 18:58  Mg     1.7     02-05    TPro  5.5<L>  /  Alb  2.7<L>  /  TBili  <0.2  /  DBili  x   /  AST  57<H>  /  ALT  29  /  AlkPhos  75  02-05    eGFR if Non African American: 103 mL/min/1.73M2 (02-05-21 @ 18:58)  eGFR if African American: 119 mL/min/1.73M2 (02-05-21 @ 18:58)    LIVER FUNCTIONS - ( 05 Feb 2021 05:46 )  Alb: 2.7 g/dL / Pro: 5.5 g/dL / ALK PHOS: 75 U/L / ALT: 29 U/L / AST: 57 U/L / GGT: x               Culture - Urine (collected 02-04-21 @ 19:40)  Source: .Urine Clean Catch (Midstream)  Preliminary Report (02-05-21 @ 18:47):    10,000 - 49,000 CFU/mL Yeast like cells    Culture - Blood (collected 02-03-21 @ 16:55)  Source: .Blood Blood  Preliminary Report (02-05-21 @ 01:02):    No growth to date.        Blood Gas Venous - Lactate: 1.3 mmoL/L (02-03-21 @ 07:37)      INFECTIOUS DISEASES TESTING  Legionella Antigen, Urine: Negative (02-04-21 @ 19:40)  Procalcitonin, Serum: 0.05 ng/mL (02-03-21 @ 16:57)  Fungitell: 190 pg/mL (02-03-21 @ 16:57)  COVID-19 PCR: NotDetec (02-03-21 @ 04:44)      INFLAMMATORY MARKERS  C-Reactive Protein, Serum: 0.23 mg/dL (02-03-21 @ 16:57)      RADIOLOGY & ADDITIONAL TESTS:  I have personally reviewed the last Chest xray  CXR      CT      CARDIOLOGY TESTING  12 Lead ECG:   Ventricular Rate 73 BPM    Atrial Rate 73 BPM    P-R Interval 120 ms    QRS Duration 82 ms    Q-T Interval 420 ms    QTC Calculation(Bazett) 462 ms    P Axis 73 degrees    R Axis 84 degrees    T Axis 82 degrees    Diagnosis Line Normal sinus rhythm  Normal ECG    Confirmed by Melecio Paulino ([id number]) on 2/4/2021 11:52:25 AM (02-04-21 @ 09:47)  12 Lead ECG:   Ventricular Rate 85 BPM    Atrial Rate 85 BPM    P-R Interval 136 ms    QRS Duration 86 ms    Q-T Interval 386 ms    QTC Calculation(Bazett) 459 ms    P Axis 83 degrees    R Axis 77 degrees    T Axis 79 degrees    Diagnosis Line Normal sinus rhythm  Biatrial enlargement  Abnormal ECG    Confirmed by Willard Thomas ([id number]) on 2/3/2021 1:44:37 PM (02-03-21 @ 05:53)      MEDICATIONS  aspirin  chewable 81 Oral daily  atorvastatin 20 Oral at bedtime  chlorhexidine 4% Liquid 1 Topical <User Schedule>  dextrose 40% Gel 15 Oral once  dextrose 40% Gel 15 Oral once  dextrose 5%. 1000 IV Continuous <Continuous>  dextrose 5%. 1000 IV Continuous <Continuous>  dextrose 5%. 1000 IV Continuous <Continuous>  dextrose 50% Injectable 25 IV Push once  dextrose 50% Injectable 12.5 IV Push once  dextrose 50% Injectable 25 IV Push once  enoxaparin Injectable 40 SubCutaneous at bedtime  folic acid 1 Oral daily  glucagon  Injectable 1 IntraMuscular once  insulin glargine Injectable (LANTUS) 20 SubCutaneous at bedtime  insulin lispro (ADMELOG) corrective regimen sliding scale  SubCutaneous three times a day before meals  insulin lispro Injectable (ADMELOG) 10 SubCutaneous three times a day before meals  LORazepam     Tablet 1 Oral every 8 hours  magnesium oxide 400 Oral once  melatonin 5 Oral at bedtime  multivitamin 1 Oral daily  nicotine  Polacrilex Gum 2 Oral every 4 hours  pantoprazole    Tablet 40 Oral before breakfast  sodium chloride 0.9%. 1000 IV Continuous <Continuous>  thiamine 100 Oral at bedtime      Weight  Weight (kg): 45 (02-03-21 @ 04:55)    ANTIBIOTICS:      ALLERGIES:  No Known Allergies

## 2021-02-06 NOTE — CONSULT NOTE ADULT - ASSESSMENT
ASSESSMENT  54yo female w pmhx of  HTN, DM, Liver cirrhosis, ETOH abuse (last drink yesterday 2/2) and current smoker of 1/2pack/day x 40 years who  presents to ED with complaints of cough. Pt states cough has been on going and recurrent for a few months, productive with yellow sputum. Patient states that she recently moved back to [place] 2 weeks ago after spending the last 20+ years in [place] and the cough has been progressively gotten worse since returning. Pt also endorses having >50lb unintentional weight loss over the past few months. Found to have uncontrolled DM. Plan for Rehab. ID consulted to "rule out communicable diseases"      IMPRESSION  #  Cough    Afebrile No leukocytosis     Legionella Antigen, Urine: Negative (02-04-21 @ 19:40)    Procalcitonin, Serum: 0.05 ng/mL (02-03-21 @ 16:57)    COVID-19 PCR: NotDetec (02-03-21 @ 04:44)  < from: Xray Chest 1 View- PORTABLE-Routine (Xray Chest 1 View- PORTABLE-Routine in AM.) (02.04.21 @ 09:25) >No focal consolidation, pneumothorax or pleural effusion.  #Vulvovaginal candidiasis - s/p Diflucan    Fungitell: 190 pg/mL (02-03-21 @ 16:57), yeast in UA  #Hyponatremia, Hyperkalemia  #Uncontrolled DM  #ETOH abuse  #Severe protein calorie malnutrition BMI (kg/m2): 17    Creatinine, Serum: 0.6 (02-05-21 @ 18:58)      RECOMMENDATIONS  This is an incomplete consult note. All recommendations to follow after interview and examination of the patient.   - HIV AB/Ag  - Pt refusing CT scan    If any questions, please call or send a message on [employer] Teams  Spectra [phone]   ASSESSMENT  56yo female w pmhx of  HTN, DM, Liver cirrhosis, ETOH abuse (last drink yesterday 2/2) and current smoker of 1/2pack/day x 40 years who  presents to ED with complaints of cough. Pt states cough has been on going and recurrent for a few months, productive with yellow sputum. Patient states that she recently moved back to [place] 2 weeks ago after spending the last 20+ years in [place] and the cough has been progressively gotten worse since returning. Pt also endorses having >50lb unintentional weight loss over the past few months. Found to have uncontrolled DM. Plan for Rehab. ID consulted to "rule out communicable diseases"      IMPRESSION  #Cough, no clear evidence of active infection, + smoker     Afebrile No leukocytosis     Legionella Antigen, Urine: Negative (02-04-21 @ 19:40)    Procalcitonin, Serum: 0.05 ng/mL (02-03-21 @ 16:57)    COVID-19 PCR: NotDetec (02-03-21 @ 04:44)  < from: Xray Chest 1 View- PORTABLE-Routine (Xray Chest 1 View- PORTABLE-Routine in AM.) (02.04.21 @ 09:25) >No focal consolidation, pneumothorax or pleural effusion.  #Vulvovaginal candidiasis - s/p Diflucan    Fungitell: 190 pg/mL (02-03-21 @ 16:57), yeast in UA    If +HIV, PCP would be on the differential however doubt with negative CXR  #Hyponatremia, Hyperkalemia  #Uncontrolled DM  #ETOH abuse  #Severe protein calorie malnutrition BMI (kg/m2): 17    Creatinine, Serum: 0.6 (02-05-21 @ 18:58)      RECOMMENDATIONS  - HIV AB/Ag  - Pt refusing CT scan  - Do not believe pt has active TB, but could send quantiferon or place PPD if patient allows  - No contraindication to ETOH rehab from ID standpoint    If any questions, please call or send a message on [employer] Teams  Spectra [phone]

## 2021-02-06 NOTE — PROGRESS NOTE ADULT - ASSESSMENT
54yo female w pmhx of  HTN, DM, Liver cirrhosis, ETOH abuse (last drink yesterday 2/2) and current smoker of 1/2pack/day x 40 years who presents for a productive cough and found to be Hyperglycemic on admission.    #Uncontrolled DM w severe hyperglycemia--resolved  - On admission glucose 947, AG 15, BHB 1.8, hyponatremia (normal when correcting for glucose)  - Pt endorses not taking home DM medications  - Sugars poorly controlled; was hyperglycemic overnight, received IV and SQ insulin, became hypoglycemic. Now under control  - A1c >15.5    #Fall  - Patient fell while going to the bathroom--she ambulates freely with no limitations  - Neuro and musculoskeletal exam normal, patient does not report any pain  - No workup needed    #Suspected UTI in setting of DKA/HHS--resolved  #PNA ruled out  - UA w large LE and mod bacteria but no leukocytosis or fevers  - >40 years hx of smoking; CXR w hyperinflated lungs but otherwise neg  - Procal 0.05  - Cultures negative, UCx pending  - Received 3 days of abx, d/c  - Outpatient pulm follow up    #Suspected malignancy  - Given smoking hx, unintentional weight loss, symptoms of night sweats, ordered CT chest and abdomen with IV contrast to r/o lymphoma  - CEA elevated 28.5  - Patient refused CT scan despite explanation of risks of undiagnosed malignancy  - Will refer to outpt GI and MAP clinic for cancer screening    #Alcohol dependence disorder w suspected folate and thiamine deficiency  #h/o liver cirrhosis  - Last drink day before admission  - Not in withdrawal  - CIWA PRN  - c/w MVI, thiamine, folate  - ALP and AST mildly elevated  - f/u AFB  - CEA elevated 28.5  - RUQ US: hepatic steatosis, contracted GB  - will d/c to inpatient rehab per CATCH team    #h/o HTN (not on home meds)  - Hypotensive on admission    GI ppx: pantoprazole  DVT ppx: Lovenox  Diet: DASH  Activity: IAT      Pending inpatient rehab placement--facility requested ID eval prior to acceptance

## 2021-02-07 LAB
ALBUMIN SERPL ELPH-MCNC: 2.6 G/DL — LOW (ref 3.5–5.2)
ALP SERPL-CCNC: 66 U/L — SIGNIFICANT CHANGE UP (ref 30–115)
ALT FLD-CCNC: 30 U/L — SIGNIFICANT CHANGE UP (ref 0–41)
ANION GAP SERPL CALC-SCNC: 8 MMOL/L — SIGNIFICANT CHANGE UP (ref 7–14)
AST SERPL-CCNC: 40 U/L — SIGNIFICANT CHANGE UP (ref 0–41)
BASOPHILS # BLD AUTO: 0.03 K/UL — SIGNIFICANT CHANGE UP (ref 0–0.2)
BASOPHILS NFR BLD AUTO: 0.7 % — SIGNIFICANT CHANGE UP (ref 0–1)
BILIRUB SERPL-MCNC: <0.2 MG/DL — SIGNIFICANT CHANGE UP (ref 0.2–1.2)
BUN SERPL-MCNC: 5 MG/DL — LOW (ref 10–20)
CALCIUM SERPL-MCNC: 8.7 MG/DL — SIGNIFICANT CHANGE UP (ref 8.5–10.1)
CHLORIDE SERPL-SCNC: 103 MMOL/L — SIGNIFICANT CHANGE UP (ref 98–110)
CO2 SERPL-SCNC: 22 MMOL/L — SIGNIFICANT CHANGE UP (ref 17–32)
CREAT SERPL-MCNC: <0.5 MG/DL — LOW (ref 0.7–1.5)
EOSINOPHIL # BLD AUTO: 0.02 K/UL — SIGNIFICANT CHANGE UP (ref 0–0.7)
EOSINOPHIL NFR BLD AUTO: 0.5 % — SIGNIFICANT CHANGE UP (ref 0–8)
GLUCOSE BLDC GLUCOMTR-MCNC: 226 MG/DL — HIGH (ref 70–99)
GLUCOSE BLDC GLUCOMTR-MCNC: 292 MG/DL — HIGH (ref 70–99)
GLUCOSE BLDC GLUCOMTR-MCNC: 304 MG/DL — HIGH (ref 70–99)
GLUCOSE BLDC GLUCOMTR-MCNC: 323 MG/DL — HIGH (ref 70–99)
GLUCOSE SERPL-MCNC: 138 MG/DL — HIGH (ref 70–99)
HCT VFR BLD CALC: 34.7 % — LOW (ref 37–47)
HGB BLD-MCNC: 10.6 G/DL — LOW (ref 12–16)
IMM GRANULOCYTES NFR BLD AUTO: 0.5 % — HIGH (ref 0.1–0.3)
LYMPHOCYTES # BLD AUTO: 2.11 K/UL — SIGNIFICANT CHANGE UP (ref 1.2–3.4)
LYMPHOCYTES # BLD AUTO: 50.4 % — SIGNIFICANT CHANGE UP (ref 20.5–51.1)
MAGNESIUM SERPL-MCNC: 1.6 MG/DL — LOW (ref 1.8–2.4)
MCHC RBC-ENTMCNC: 25.7 PG — LOW (ref 27–31)
MCHC RBC-ENTMCNC: 30.5 G/DL — LOW (ref 32–37)
MCV RBC AUTO: 84 FL — SIGNIFICANT CHANGE UP (ref 81–99)
MONOCYTES # BLD AUTO: 0.49 K/UL — SIGNIFICANT CHANGE UP (ref 0.1–0.6)
MONOCYTES NFR BLD AUTO: 11.7 % — HIGH (ref 1.7–9.3)
NEUTROPHILS # BLD AUTO: 1.52 K/UL — SIGNIFICANT CHANGE UP (ref 1.4–6.5)
NEUTROPHILS NFR BLD AUTO: 36.2 % — LOW (ref 42.2–75.2)
NRBC # BLD: 0 /100 WBCS — SIGNIFICANT CHANGE UP (ref 0–0)
PLATELET # BLD AUTO: 218 K/UL — SIGNIFICANT CHANGE UP (ref 130–400)
POTASSIUM SERPL-MCNC: 4.4 MMOL/L — SIGNIFICANT CHANGE UP (ref 3.5–5)
POTASSIUM SERPL-SCNC: 4.4 MMOL/L — SIGNIFICANT CHANGE UP (ref 3.5–5)
PROT SERPL-MCNC: 5.5 G/DL — LOW (ref 6–8)
RBC # BLD: 4.13 M/UL — LOW (ref 4.2–5.4)
RBC # FLD: 15.4 % — HIGH (ref 11.5–14.5)
SODIUM SERPL-SCNC: 133 MMOL/L — LOW (ref 135–146)
WBC # BLD: 4.19 K/UL — LOW (ref 4.8–10.8)
WBC # FLD AUTO: 4.19 K/UL — LOW (ref 4.8–10.8)

## 2021-02-07 PROCEDURE — 99233 SBSQ HOSP IP/OBS HIGH 50: CPT

## 2021-02-07 RX ORDER — QUETIAPINE FUMARATE 200 MG/1
200 TABLET, FILM COATED ORAL ONCE
Refills: 0 | Status: COMPLETED | OUTPATIENT
Start: 2021-02-07 | End: 2021-02-07

## 2021-02-07 RX ADMIN — Medication 2 MILLIGRAM(S): at 05:35

## 2021-02-07 RX ADMIN — Medication 2 MILLIGRAM(S): at 16:38

## 2021-02-07 RX ADMIN — Medication 100 MILLIGRAM(S): at 20:31

## 2021-02-07 RX ADMIN — Medication 2 MILLIGRAM(S): at 11:40

## 2021-02-07 RX ADMIN — CHLORHEXIDINE GLUCONATE 1 APPLICATION(S): 213 SOLUTION TOPICAL at 05:35

## 2021-02-07 RX ADMIN — Medication 10 UNIT(S): at 08:22

## 2021-02-07 RX ADMIN — Medication 1 TABLET(S): at 11:40

## 2021-02-07 RX ADMIN — Medication 5 MILLIGRAM(S): at 20:31

## 2021-02-07 RX ADMIN — Medication 2: at 08:22

## 2021-02-07 RX ADMIN — QUETIAPINE FUMARATE 200 MILLIGRAM(S): 200 TABLET, FILM COATED ORAL at 20:31

## 2021-02-07 RX ADMIN — Medication 10 UNIT(S): at 16:38

## 2021-02-07 RX ADMIN — ATORVASTATIN CALCIUM 20 MILLIGRAM(S): 80 TABLET, FILM COATED ORAL at 20:30

## 2021-02-07 RX ADMIN — Medication 10 UNIT(S): at 11:42

## 2021-02-07 RX ADMIN — Medication 81 MILLIGRAM(S): at 11:41

## 2021-02-07 RX ADMIN — Medication 2 MILLIGRAM(S): at 14:56

## 2021-02-07 RX ADMIN — Medication 3: at 11:42

## 2021-02-07 RX ADMIN — Medication 650 MILLIGRAM(S): at 06:13

## 2021-02-07 RX ADMIN — Medication 1 MILLIGRAM(S): at 11:41

## 2021-02-07 RX ADMIN — INSULIN GLARGINE 20 UNIT(S): 100 INJECTION, SOLUTION SUBCUTANEOUS at 20:30

## 2021-02-07 RX ADMIN — PANTOPRAZOLE SODIUM 40 MILLIGRAM(S): 20 TABLET, DELAYED RELEASE ORAL at 05:35

## 2021-02-07 RX ADMIN — Medication 4: at 16:38

## 2021-02-07 RX ADMIN — Medication 2 MILLIGRAM(S): at 15:35

## 2021-02-07 RX ADMIN — ENOXAPARIN SODIUM 40 MILLIGRAM(S): 100 INJECTION SUBCUTANEOUS at 20:31

## 2021-02-07 NOTE — PROGRESS NOTE ADULT - ASSESSMENT
56yo female, noncompliant with medications and doctors follow up, who recently moved from [place], [facility] significant for HTN, DM2 (not compliant with Lantus), liver cirrhosis, ETOH dependence (last drink on 2/2), chronic smoker, 1/2 pack a day for >40 years, presented to ED with complaints of generalized weakness, decreased appetite, cough and burning urination with vaginal itching. Cough is chronic, associated with yellowish sputum. Pt reports she lost ~50lbs in last 3 months unintentionally.     # Fall - no signs of fractures  - CTH negative  - XR arm, hip pending read, will follow.    # HHS. DM2 with hyperglycemia likely due to noncompliance.  - continue lantus 20 units QHS  - added prandial Lispro 10 units TID with meals  - f/u HbA1C >15.5  - pt will benefit from combination oral meds like Janumet and Insulin on discharge  - she will need glucometer, lancets, strips and alcohol swabs to be send to her pharmacy  - pt will benefit from ASA and Statins for CAD ppx, added     # Weight loss, might be due to uncontrolled DM, rule out malignancy, pt is a chronic heavy smoker, not up to date with age appropriate cancer screening. States she had mammogram done 3 years ago, no PAP smear for many years, colonoscopy many years ago. +B symptoms, +anemia.  - pt refused CT scans  - advised to establish care with PMD and get age appropriate screening and further work up, pt expressed understanding   - AFP 3.5, CEA 28 (elevated), iron studies pending  - pt will need GI OP for colonoscopy    # Chronic cough - pt states cough is chronic, no changes in intensity or change in sputum color, no evidence of PNA, doubt TB, most likely due to chronic smoking, ?bronchiectasis.  - pt refused CT chest, will do it OP  - Quantiferon was not sent, lab couldn't draw blood, reordered    # Hypokalemia - supplemented, resolved     # Hyponatremia due to hypoglycemia - resolved    # UTI ruled out, UCX 10-49 yeast  - no need for ABx  - pt was already treated for vulvovaginal candidiasis     # Vulvovaginal candidiasis - given one dose of Diflucan    # Alcohol dependence with abuse - no signs of withdrawal, monitor CIWA, Ativan PRN.   - CATCH team eval appreciated, Pt to be discharged to inpatient rehab     # Liver cirrhosis - LFT's mildly elevated AST, no signs of ascites, normal Plt, normal creatinine and INR.  - OP GI follow up    # Nicotine dependence - smoking cessation counselling provided on admission.  Pt does not want nicotine patch, she prefers nicotine gum.      # Suspected Thiamine and Folate deficiency - continue supplement     Pt has OP MAP clinic appointment scheduled on February 22 at 8:30AM.     ID cleared pt for Inpatient alcohol rehab. Will follow up work up after fall

## 2021-02-08 PROBLEM — E11.9 TYPE 2 DIABETES MELLITUS WITHOUT COMPLICATIONS: Chronic | Status: ACTIVE | Noted: 2021-02-03

## 2021-02-08 PROBLEM — K74.60 UNSPECIFIED CIRRHOSIS OF LIVER: Chronic | Status: ACTIVE | Noted: 2021-02-03

## 2021-02-08 PROBLEM — I10 ESSENTIAL (PRIMARY) HYPERTENSION: Chronic | Status: ACTIVE | Noted: 2021-02-03

## 2021-02-08 LAB
ALBUMIN SERPL ELPH-MCNC: 2.6 G/DL — LOW (ref 3.5–5.2)
ALP SERPL-CCNC: 63 U/L — SIGNIFICANT CHANGE UP (ref 30–115)
ALT FLD-CCNC: 31 U/L — SIGNIFICANT CHANGE UP (ref 0–41)
ANION GAP SERPL CALC-SCNC: 11 MMOL/L — SIGNIFICANT CHANGE UP (ref 7–14)
AST SERPL-CCNC: 38 U/L — SIGNIFICANT CHANGE UP (ref 0–41)
BASOPHILS # BLD AUTO: 0.03 K/UL — SIGNIFICANT CHANGE UP (ref 0–0.2)
BASOPHILS NFR BLD AUTO: 0.8 % — SIGNIFICANT CHANGE UP (ref 0–1)
BILIRUB SERPL-MCNC: <0.2 MG/DL — SIGNIFICANT CHANGE UP (ref 0.2–1.2)
BUN SERPL-MCNC: 8 MG/DL — LOW (ref 10–20)
CALCIUM SERPL-MCNC: 8.1 MG/DL — LOW (ref 8.5–10.1)
CHLORIDE SERPL-SCNC: 103 MMOL/L — SIGNIFICANT CHANGE UP (ref 98–110)
CO2 SERPL-SCNC: 22 MMOL/L — SIGNIFICANT CHANGE UP (ref 17–32)
CREAT SERPL-MCNC: 0.5 MG/DL — LOW (ref 0.7–1.5)
EOSINOPHIL # BLD AUTO: 0.02 K/UL — SIGNIFICANT CHANGE UP (ref 0–0.7)
EOSINOPHIL NFR BLD AUTO: 0.5 % — SIGNIFICANT CHANGE UP (ref 0–8)
GLUCOSE BLDC GLUCOMTR-MCNC: 107 MG/DL — HIGH (ref 70–99)
GLUCOSE BLDC GLUCOMTR-MCNC: 170 MG/DL — HIGH (ref 70–99)
GLUCOSE BLDC GLUCOMTR-MCNC: 216 MG/DL — HIGH (ref 70–99)
GLUCOSE BLDC GLUCOMTR-MCNC: 59 MG/DL — LOW (ref 70–99)
GLUCOSE SERPL-MCNC: 237 MG/DL — HIGH (ref 70–99)
HCT VFR BLD CALC: 30.2 % — LOW (ref 37–47)
HGB BLD-MCNC: 9.1 G/DL — LOW (ref 12–16)
HIV 1+2 AB+HIV1 P24 AG SERPL QL IA: SIGNIFICANT CHANGE UP
IMM GRANULOCYTES NFR BLD AUTO: 0.5 % — HIGH (ref 0.1–0.3)
LYMPHOCYTES # BLD AUTO: 2.06 K/UL — SIGNIFICANT CHANGE UP (ref 1.2–3.4)
LYMPHOCYTES # BLD AUTO: 53.9 % — HIGH (ref 20.5–51.1)
MAGNESIUM SERPL-MCNC: 1.5 MG/DL — LOW (ref 1.8–2.4)
MCHC RBC-ENTMCNC: 25.3 PG — LOW (ref 27–31)
MCHC RBC-ENTMCNC: 30.1 G/DL — LOW (ref 32–37)
MCV RBC AUTO: 83.9 FL — SIGNIFICANT CHANGE UP (ref 81–99)
MONOCYTES # BLD AUTO: 0.45 K/UL — SIGNIFICANT CHANGE UP (ref 0.1–0.6)
MONOCYTES NFR BLD AUTO: 11.8 % — HIGH (ref 1.7–9.3)
NEUTROPHILS # BLD AUTO: 1.24 K/UL — LOW (ref 1.4–6.5)
NEUTROPHILS NFR BLD AUTO: 32.5 % — LOW (ref 42.2–75.2)
NRBC # BLD: 0 /100 WBCS — SIGNIFICANT CHANGE UP (ref 0–0)
PLATELET # BLD AUTO: 202 K/UL — SIGNIFICANT CHANGE UP (ref 130–400)
POTASSIUM SERPL-MCNC: 4.7 MMOL/L — SIGNIFICANT CHANGE UP (ref 3.5–5)
POTASSIUM SERPL-SCNC: 4.7 MMOL/L — SIGNIFICANT CHANGE UP (ref 3.5–5)
PROT SERPL-MCNC: 5.1 G/DL — LOW (ref 6–8)
RBC # BLD: 3.6 M/UL — LOW (ref 4.2–5.4)
RBC # FLD: 15.1 % — HIGH (ref 11.5–14.5)
S PNEUM AG UR QL: NEGATIVE — SIGNIFICANT CHANGE UP
SODIUM SERPL-SCNC: 136 MMOL/L — SIGNIFICANT CHANGE UP (ref 135–146)
WBC # BLD: 3.82 K/UL — LOW (ref 4.8–10.8)
WBC # FLD AUTO: 3.82 K/UL — LOW (ref 4.8–10.8)

## 2021-02-08 PROCEDURE — 78306 BONE IMAGING WHOLE BODY: CPT | Mod: 26

## 2021-02-08 PROCEDURE — 78803 RP LOCLZJ TUM SPECT 1 AREA: CPT | Mod: 26

## 2021-02-08 PROCEDURE — 99233 SBSQ HOSP IP/OBS HIGH 50: CPT

## 2021-02-08 RX ORDER — IOHEXOL 300 MG/ML
30 INJECTION, SOLUTION INTRAVENOUS ONCE
Refills: 0 | Status: COMPLETED | OUTPATIENT
Start: 2021-02-08 | End: 2021-02-08

## 2021-02-08 RX ORDER — MAGNESIUM SULFATE 500 MG/ML
2 VIAL (ML) INJECTION
Refills: 0 | Status: DISCONTINUED | OUTPATIENT
Start: 2021-02-08 | End: 2021-02-08

## 2021-02-08 RX ORDER — MAGNESIUM SULFATE 500 MG/ML
2 VIAL (ML) INJECTION
Refills: 0 | Status: COMPLETED | OUTPATIENT
Start: 2021-02-08 | End: 2021-02-08

## 2021-02-08 RX ORDER — MAGNESIUM SULFATE 500 MG/ML
2 VIAL (ML) INJECTION ONCE
Refills: 0 | Status: DISCONTINUED | OUTPATIENT
Start: 2021-02-08 | End: 2021-02-08

## 2021-02-08 RX ADMIN — Medication 1 TABLET(S): at 10:45

## 2021-02-08 RX ADMIN — ENOXAPARIN SODIUM 40 MILLIGRAM(S): 100 INJECTION SUBCUTANEOUS at 21:23

## 2021-02-08 RX ADMIN — Medication 2 MILLIGRAM(S): at 09:03

## 2021-02-08 RX ADMIN — Medication 1 MILLIGRAM(S): at 10:45

## 2021-02-08 RX ADMIN — Medication 50 GRAM(S): at 10:44

## 2021-02-08 RX ADMIN — Medication 5 MILLIGRAM(S): at 21:23

## 2021-02-08 RX ADMIN — Medication 2 MILLIGRAM(S): at 05:40

## 2021-02-08 RX ADMIN — INSULIN GLARGINE 20 UNIT(S): 100 INJECTION, SOLUTION SUBCUTANEOUS at 21:23

## 2021-02-08 RX ADMIN — Medication 2 MILLIGRAM(S): at 02:36

## 2021-02-08 RX ADMIN — Medication 2 MILLIGRAM(S): at 16:57

## 2021-02-08 RX ADMIN — Medication 10 UNIT(S): at 09:02

## 2021-02-08 RX ADMIN — Medication 50 GRAM(S): at 09:21

## 2021-02-08 RX ADMIN — Medication 650 MILLIGRAM(S): at 21:11

## 2021-02-08 RX ADMIN — Medication 2: at 09:02

## 2021-02-08 RX ADMIN — PANTOPRAZOLE SODIUM 40 MILLIGRAM(S): 20 TABLET, DELAYED RELEASE ORAL at 05:40

## 2021-02-08 RX ADMIN — Medication 10 UNIT(S): at 12:05

## 2021-02-08 RX ADMIN — Medication 2 MILLIGRAM(S): at 21:24

## 2021-02-08 RX ADMIN — Medication 2 MILLIGRAM(S): at 12:06

## 2021-02-08 RX ADMIN — Medication 100 MILLIGRAM(S): at 21:23

## 2021-02-08 RX ADMIN — ATORVASTATIN CALCIUM 20 MILLIGRAM(S): 80 TABLET, FILM COATED ORAL at 21:23

## 2021-02-08 RX ADMIN — Medication 81 MILLIGRAM(S): at 10:45

## 2021-02-08 RX ADMIN — IOHEXOL 30 MILLILITER(S): 300 INJECTION, SOLUTION INTRAVENOUS at 18:46

## 2021-02-08 NOTE — PROGRESS NOTE ADULT - ASSESSMENT
56yo female, noncompliant with medications and doctors follow up, who recently moved from [place], [facility] significant for HTN, DM2 (not compliant with Lantus), liver cirrhosis, ETOH dependence (last drink on 2/2), chronic smoker, 1/2 pack a day for >40 years, presented to ED with complaints of generalized weakness, decreased appetite, cough and burning urination with vaginal itching. Cough is chronic, associated with yellowish sputum. Pt reports she lost ~50lbs in last 3 months unintentionally.     # Fall - no signs of fractures  - CTH negative  - XR left shoulder was positive for left 2nd rib fracture, if concern for malignancy recommended furtehr work up.  - bone scan  done today - negative    # HHS. DM2 with hyperglycemia likely due to noncompliance.  - continue lantus 20 units QHS  - added prandial Lispro 10 units TID with meals  - f/u HbA1C >15.5  - pt will benefit from combination oral meds like Janumet and Insulin on discharge  - she will need glucometer, lancets, strips and alcohol swabs to be send to her pharmacy  - pt will benefit from ASA and Statins for CAD ppx, added     # Weight loss, might be due to uncontrolled DM, rule out malignancy, pt is a chronic heavy smoker, not up to date with age appropriate cancer screening. States she had mammogram done 3 years ago, no PAP smear for many years, colonoscopy many years ago. +B symptoms, +anemia.  - CT chest/abd/pelvis  - advised to establish care with PMD and get age appropriate screening and further work up, pt expressed understanding   - AFP 3.5, CEA 28 (elevated), iron studies pending  - pt will need GI OP for colonoscopy    # Chronic cough - pt states cough is chronic, no changes in intensity or change in sputum color, no evidence of PNA, doubt TB, most likely due to chronic smoking, ?bronchiectasis.  - pt refused CT chest, will do it OP  - f/u Quantiferon     # Hypokalemia - supplemented, resolved     # Hyponatremia due to hypoglycemia - resolved    # Hypomagnesemia - supplemented    # UTI ruled out, UCX 10-49 yeast  - no need for ABx  - pt was already treated for vulvovaginal candidiasis     # Vulvovaginal candidiasis - given one dose of Diflucan    # Alcohol dependence with abuse - no signs of withdrawal, monitor CIWA, Ativan PRN.   - CATCH team [place]arte appreciated, Pt to be discharged to inpatient rehab     # Liver cirrhosis - LFT's mildly elevated AST, no signs of ascites, normal Plt, normal creatinine and INR.  - OP GI follow up    # Nicotine dependence - smoking cessation counselling provided on admission.  Pt does not want nicotine patch, she prefers nicotine gum.      # Suspected Thiamine and Folate deficiency - continue supplement     Pt has OP MAP clinic appointment scheduled on February 22 at 8:30AM.     ID cleared pt for Inpatient alcohol rehab.   Anticipated DC tomorrow if no significant findings on CT chest/abd.

## 2021-02-08 NOTE — PROGRESS NOTE ADULT - ASSESSMENT
54yo female w pmhx of  HTN, DM, Liver cirrhosis, ETOH abuse (last drink yesterday 2/2) and current smoker of 1/2pack/day x 40 years who presents for a productive cough and found to be Hyperglycemic on admission.    #Suspected malignancy  - Given smoking hx, unintentional weight loss, symptoms of night sweats  - CEA elevated 28.5  - Bone scan, CT Chest and Abd pending; pt initially refusing, now agreeable  - Will refer to outpt GI and MAP clinic for further age approp. cancer screening    #Uncontrolled DM w severe hyperglycemia--resolved  - On admission glucose 947, AG 15, BHB 1.8, hyponatremia   - Pt endorses not taking home DM medications  - Pt was managed with SQ insulin, not requiring insulin drip; FS improved  - HbA1c > 15%  - Diabetic supplies, Janumet, insulin sent to pharmacy; pt states she knows how to inject insulin and check FS    #Multiple mechanical falls during hospital stay  - Pt has fallen multiple times during stay, at times, when witnessed by staff, appears to be deliberately falling  - Has not had HT, LOC, CT Head negative  - R humerus xray revealed R 2nd rib fx, suspicious for fx vs metastatic lesion; pt has no chest pain, no difficulty moving or respirating    #Asymptomatic bacteruria   #PNA ruled out  - UA w large LE and mod bacteria but no leukocytosis or fevers  - Blood Cx negative, UCx grew Candida, likely contaminant  - >40 years hx of smoking; CXR w hyperinflated lungs but otherwise neg  - s/p ABx tx with Ceftriaxone, Azithromycin  - Outpatient pulm follow up    #Alcohol dependence disorder w suspected folate and thiamine deficiency  #h/o liver cirrhosis  - Last drink day before admission  - Not in withdrawal  - CIWA PRN  - c/w MVI, thiamine, folate  - RUQ US: hepatic steatosis, contracted GB  - will d/c to inpatient rehab per [facility] team      GI ppx: pantoprazole  DVT ppx: Lovenox  Diet: DASH  Activity: IAT  Dispo: dc likely today to rehab, pending bone scan, CT chest and abdomen   54yo female w pmhx of  HTN, DM, Liver cirrhosis, ETOH abuse (last drink yesterday 2/2) and current smoker of 1/2pack/day x 40 years who presents for a productive cough and found to be Hyperglycemic on admission.    #Suspected malignancy  - Given smoking hx, unintentional weight loss, symptoms of night sweats  - CEA elevated 28.5  - Bone scan, CT Chest and Abd pending; pt initially refusing, now agreeable  - Will refer to outpt GI and MAP clinic for further age approp. cancer screening    #Uncontrolled DM w severe hyperglycemia--resolved  - On admission glucose 947, AG 15, BHB 1.8, hyponatremia   - Pt endorses not taking home DM medications  - Pt was managed with SQ insulin, not requiring insulin drip; FS improved  - HbA1c > 15%  - Diabetic supplies, Janumet, insulin sent to pharmacy; pt states she knows how to inject insulin and check FS    #Multiple mechanical falls during hospital stay  - Pt has fallen multiple times during stay, at times, when witnessed by staff, appears to be deliberately falling  - Has not had HT, LOC, CT Head negative  - R humerus xray revealed R 2nd rib fx, suspicious for fx vs metastatic lesion; pt has no chest pain, no difficulty moving or respirating    #Asymptomatic bacteruria   #PNA ruled out  - UA w large LE and mod bacteria but no leukocytosis or fevers  - Blood Cx negative, UCx grew Candida, likely contaminant  - >40 years hx of smoking; CXR w hyperinflated lungs but otherwise neg  - s/p ABx tx with Ceftriaxone, Azithromycin  - Outpatient pulm follow up    #Alcohol dependence disorder w suspected folate and thiamine deficiency  #h/o liver cirrhosis  - Last drink day before admission  - Not in withdrawal  - CIWA PRN  - c/w MVI, thiamine, folate  - RUQ US: hepatic steatosis, contracted GB  - will d/c to inpatient rehab per [facility] team, when cancer workup done      GI ppx: pantoprazole  DVT ppx: Lovenox  Diet: DASH  Activity: IAT  Dispo: acute

## 2021-02-09 ENCOUNTER — TRANSCRIPTION ENCOUNTER (OUTPATIENT)
Age: 56
End: 2021-02-09

## 2021-02-09 VITALS
TEMPERATURE: 97 F | SYSTOLIC BLOOD PRESSURE: 124 MMHG | DIASTOLIC BLOOD PRESSURE: 86 MMHG | HEART RATE: 82 BPM | RESPIRATION RATE: 18 BRPM

## 2021-02-09 LAB
ALBUMIN SERPL ELPH-MCNC: 2.7 G/DL — LOW (ref 3.5–5.2)
ALP SERPL-CCNC: 63 U/L — SIGNIFICANT CHANGE UP (ref 30–115)
ALT FLD-CCNC: 33 U/L — SIGNIFICANT CHANGE UP (ref 0–41)
ANION GAP SERPL CALC-SCNC: 7 MMOL/L — SIGNIFICANT CHANGE UP (ref 7–14)
AST SERPL-CCNC: 48 U/L — HIGH (ref 0–41)
BASOPHILS # BLD AUTO: 0 K/UL — SIGNIFICANT CHANGE UP (ref 0–0.2)
BASOPHILS NFR BLD AUTO: 0 % — SIGNIFICANT CHANGE UP (ref 0–1)
BILIRUB SERPL-MCNC: <0.2 MG/DL — SIGNIFICANT CHANGE UP (ref 0.2–1.2)
BUN SERPL-MCNC: 8 MG/DL — LOW (ref 10–20)
CALCIUM SERPL-MCNC: 8.3 MG/DL — LOW (ref 8.5–10.1)
CHLORIDE SERPL-SCNC: 103 MMOL/L — SIGNIFICANT CHANGE UP (ref 98–110)
CO2 SERPL-SCNC: 25 MMOL/L — SIGNIFICANT CHANGE UP (ref 17–32)
CREAT SERPL-MCNC: 0.6 MG/DL — LOW (ref 0.7–1.5)
CULTURE RESULTS: SIGNIFICANT CHANGE UP
EOSINOPHIL # BLD AUTO: 0.03 K/UL — SIGNIFICANT CHANGE UP (ref 0–0.7)
EOSINOPHIL NFR BLD AUTO: 0.9 % — SIGNIFICANT CHANGE UP (ref 0–8)
GLUCOSE BLDC GLUCOMTR-MCNC: 100 MG/DL — HIGH (ref 70–99)
GLUCOSE BLDC GLUCOMTR-MCNC: 229 MG/DL — HIGH (ref 70–99)
GLUCOSE BLDC GLUCOMTR-MCNC: 289 MG/DL — HIGH (ref 70–99)
GLUCOSE SERPL-MCNC: 151 MG/DL — HIGH (ref 70–99)
HCT VFR BLD CALC: 30.8 % — LOW (ref 37–47)
HGB BLD-MCNC: 9.5 G/DL — LOW (ref 12–16)
LYMPHOCYTES # BLD AUTO: 1.77 K/UL — SIGNIFICANT CHANGE UP (ref 1.2–3.4)
LYMPHOCYTES # BLD AUTO: 47.3 % — SIGNIFICANT CHANGE UP (ref 20.5–51.1)
MAGNESIUM SERPL-MCNC: 1.6 MG/DL — LOW (ref 1.8–2.4)
MCHC RBC-ENTMCNC: 25.7 PG — LOW (ref 27–31)
MCHC RBC-ENTMCNC: 30.8 G/DL — LOW (ref 32–37)
MCV RBC AUTO: 83.5 FL — SIGNIFICANT CHANGE UP (ref 81–99)
MONOCYTES # BLD AUTO: 0.3 K/UL — SIGNIFICANT CHANGE UP (ref 0.1–0.6)
MONOCYTES NFR BLD AUTO: 7.9 % — SIGNIFICANT CHANGE UP (ref 1.7–9.3)
NEUTROPHILS # BLD AUTO: 1.19 K/UL — LOW (ref 1.4–6.5)
NEUTROPHILS NFR BLD AUTO: 31.6 % — LOW (ref 42.2–75.2)
PLATELET # BLD AUTO: 206 K/UL — SIGNIFICANT CHANGE UP (ref 130–400)
POTASSIUM SERPL-MCNC: 4.9 MMOL/L — SIGNIFICANT CHANGE UP (ref 3.5–5)
POTASSIUM SERPL-SCNC: 4.9 MMOL/L — SIGNIFICANT CHANGE UP (ref 3.5–5)
PROT SERPL-MCNC: 5 G/DL — LOW (ref 6–8)
RBC # BLD: 3.69 M/UL — LOW (ref 4.2–5.4)
RBC # FLD: 14.8 % — HIGH (ref 11.5–14.5)
SODIUM SERPL-SCNC: 135 MMOL/L — SIGNIFICANT CHANGE UP (ref 135–146)
SPECIMEN SOURCE: SIGNIFICANT CHANGE UP
WBC # BLD: 3.75 K/UL — LOW (ref 4.8–10.8)
WBC # FLD AUTO: 3.75 K/UL — LOW (ref 4.8–10.8)

## 2021-02-09 PROCEDURE — 74177 CT ABD & PELVIS W/CONTRAST: CPT | Mod: 26

## 2021-02-09 PROCEDURE — 99239 HOSP IP/OBS DSCHRG MGMT >30: CPT

## 2021-02-09 PROCEDURE — 71260 CT THORAX DX C+: CPT | Mod: 26

## 2021-02-09 RX ORDER — MAGNESIUM SULFATE 500 MG/ML
2 VIAL (ML) INJECTION ONCE
Refills: 0 | Status: COMPLETED | OUTPATIENT
Start: 2021-02-09 | End: 2021-02-09

## 2021-02-09 RX ORDER — IOHEXOL 300 MG/ML
30 INJECTION, SOLUTION INTRAVENOUS ONCE
Refills: 0 | Status: COMPLETED | OUTPATIENT
Start: 2021-02-09 | End: 2021-02-09

## 2021-02-09 RX ADMIN — Medication 1 MILLIGRAM(S): at 10:56

## 2021-02-09 RX ADMIN — IOHEXOL 30 MILLILITER(S): 300 INJECTION, SOLUTION INTRAVENOUS at 12:30

## 2021-02-09 RX ADMIN — Medication 10 UNIT(S): at 16:30

## 2021-02-09 RX ADMIN — Medication 1 TABLET(S): at 10:56

## 2021-02-09 RX ADMIN — Medication 2 MILLIGRAM(S): at 16:31

## 2021-02-09 RX ADMIN — Medication 2: at 16:30

## 2021-02-09 RX ADMIN — Medication 2 MILLIGRAM(S): at 12:49

## 2021-02-09 RX ADMIN — Medication 2 MILLIGRAM(S): at 05:48

## 2021-02-09 RX ADMIN — Medication 2 MILLIGRAM(S): at 10:54

## 2021-02-09 RX ADMIN — Medication 650 MILLIGRAM(S): at 09:39

## 2021-02-09 RX ADMIN — CHLORHEXIDINE GLUCONATE 1 APPLICATION(S): 213 SOLUTION TOPICAL at 05:48

## 2021-02-09 RX ADMIN — Medication 81 MILLIGRAM(S): at 10:56

## 2021-02-09 RX ADMIN — PANTOPRAZOLE SODIUM 40 MILLIGRAM(S): 20 TABLET, DELAYED RELEASE ORAL at 05:50

## 2021-02-09 RX ADMIN — Medication 25 GRAM(S): at 10:55

## 2021-02-09 RX ADMIN — Medication 3: at 10:57

## 2021-02-09 RX ADMIN — Medication 2 MILLIGRAM(S): at 10:55

## 2021-02-09 NOTE — DISCHARGE NOTE NURSING/CASE MANAGEMENT/SOCIAL WORK - PATIENT PORTAL LINK FT
You can access the FollowMyHealth Patient Portal offered by [facility] by registering at the following website: [email or web address]. By joining [email or web address]’s FollowMyHealth portal, you will also be able to view your health information using other applications (apps) compatible with our system.

## 2021-02-09 NOTE — PROGRESS NOTE ADULT - NUTRITIONAL ASSESSMENT
This patient has been assessed with a concern for Malnutrition and has been determined to have a diagnosis/diagnoses of Severe protein-calorie malnutrition and Underweight (BMI < 19).    This patient is being managed with:   Diet NPO-  Except Medications  Entered: Feb 8 2021  5:24PM    
This patient has been assessed with a concern for Malnutrition and has been determined to have a diagnosis/diagnoses of Severe protein-calorie malnutrition and Underweight (BMI < 19).    This patient is being managed with:   Diet DASH/TLC-  Sodium & Cholesterol Restricted  Consistent Carbohydrate {Evening Snack}     Qty per Day:  STRAWBERRY  Prosource Gelatein 20 Sugar Free     Qty per Day:  2  Supplement Feeding Modality:  Oral  Glucerna Shake Cans or Servings Per Day:  1       Frequency:  Three Times a day  Entered: Feb 5 2021 12:41PM    Diet DASH/TLC-  Sodium & Cholesterol Restricted  Consistent Carbohydrate {No Snacks}  Entered: Feb  3 2021  3:23PM    The following pending diet order is being considered for treatment of Severe protein-calorie malnutrition and Underweight (BMI < 19):null
This patient has been assessed with a concern for Malnutrition and has been determined to have a diagnosis/diagnoses of Severe protein-calorie malnutrition and Underweight (BMI < 19).    This patient is being managed with:   Diet DASH/TLC-  Sodium & Cholesterol Restricted  Consistent Carbohydrate {Evening Snack}     Qty per Day:  STRAWBERRY  Prosource Gelatein 20 Sugar Free     Qty per Day:  2  Supplement Feeding Modality:  Oral  Glucerna Shake Cans or Servings Per Day:  1       Frequency:  Three Times a day  Entered: Feb 5 2021 12:41PM    Diet DASH/TLC-  Sodium & Cholesterol Restricted  Consistent Carbohydrate {No Snacks}  Entered: Feb  3 2021  3:23PM    The following pending diet order is being considered for treatment of Severe protein-calorie malnutrition and Underweight (BMI < 19):null
This patient has been assessed with a concern for Malnutrition and has been determined to have a diagnosis/diagnoses of Severe protein-calorie malnutrition and Underweight (BMI < 19).    This patient is being managed with:   Diet DASH/TLC-  Sodium & Cholesterol Restricted  Consistent Carbohydrate {Evening Snack}     Qty per Day:  STRAWBERRY  Prosource Gelatein 20 Sugar Free     Qty per Day:  2  Supplement Feeding Modality:  Oral  Glucerna Shake Cans or Servings Per Day:  1       Frequency:  Three Times a day  Entered: Feb 5 2021 12:41PM    
This patient has been assessed with a concern for Malnutrition and has been determined to have a diagnosis/diagnoses of Severe protein-calorie malnutrition and Underweight (BMI < 19).    This patient is being managed with:   Diet DASH/TLC-  Sodium & Cholesterol Restricted  Consistent Carbohydrate {Evening Snack}     Qty per Day:  STRAWBERRY  Prosource Gelatein 20 Sugar Free     Qty per Day:  2  Supplement Feeding Modality:  Oral  Glucerna Shake Cans or Servings Per Day:  1       Frequency:  Three Times a day  Entered: Feb 5 2021 12:41PM    Diet DASH/TLC-  Sodium & Cholesterol Restricted  Consistent Carbohydrate {No Snacks}  Entered: Feb  3 2021  3:23PM    The following pending diet order is being considered for treatment of Severe protein-calorie malnutrition and Underweight (BMI < 19):null
This patient has been assessed with a concern for Malnutrition and has been determined to have a diagnosis/diagnoses of Severe protein-calorie malnutrition and Underweight (BMI < 19).    This patient is being managed with:   Diet DASH/TLC-  Sodium & Cholesterol Restricted  Consistent Carbohydrate {Evening Snack}     Qty per Day:  STRAWBERRY  Prosource Gelatein 20 Sugar Free     Qty per Day:  2  Supplement Feeding Modality:  Oral  Glucerna Shake Cans or Servings Per Day:  1       Frequency:  Three Times a day  Entered: Feb 9 2021  2:29AM    
This patient has been assessed with a concern for Malnutrition and has been determined to have a diagnosis/diagnoses of Severe protein-calorie malnutrition and Underweight (BMI < 19).    This patient is being managed with:   Diet DASH/TLC-  Sodium & Cholesterol Restricted  Consistent Carbohydrate {Evening Snack}     Qty per Day:  STRAWBERRY  Prosource Gelatein 20 Sugar Free     Qty per Day:  2  Supplement Feeding Modality:  Oral  Glucerna Shake Cans or Servings Per Day:  1       Frequency:  Three Times a day  Entered: Feb 5 2021 12:41PM    
This patient has been assessed with a concern for Malnutrition and has been determined to have a diagnosis/diagnoses of Severe protein-calorie malnutrition and Underweight (BMI < 19).    This patient is being managed with:   Diet DASH/TLC-  Sodium & Cholesterol Restricted  Consistent Carbohydrate {Evening Snack}     Qty per Day:  STRAWBERRY  Prosource Gelatein 20 Sugar Free     Qty per Day:  2  Supplement Feeding Modality:  Oral  Glucerna Shake Cans or Servings Per Day:  1       Frequency:  Three Times a day  Entered: Feb 5 2021 12:41PM    Diet DASH/TLC-  Sodium & Cholesterol Restricted  Consistent Carbohydrate {No Snacks}  Entered: Feb  3 2021  3:23PM    The following pending diet order is being considered for treatment of Severe protein-calorie malnutrition and Underweight (BMI < 19):null

## 2021-02-09 NOTE — DISCHARGE NOTE NURSING/CASE MANAGEMENT/SOCIAL WORK - NSDCFUADDAPPT_GEN_ALL_CORE_FT
MAP clinic appointment scheduled on February 22 at 8:30AM.   [address], [address]  [phone]

## 2021-02-09 NOTE — PROGRESS NOTE ADULT - NSHPATTENDINGPLANDISCUSS_GEN_ALL_CORE
residents, nursing, , patient

## 2021-02-09 NOTE — PROGRESS NOTE ADULT - SUBJECTIVE AND OBJECTIVE BOX
Patient Information:  DUKE MARTÍNEZ / 55y / Female / MRN#:[id number]    Hospital Day: 3d    Interval History:  Patient seen and examined at bedside. No acute events overnight.    Past Medical History:  Liver cirrhosis    Diabetes mellitus    Hypertension      Past Surgical History:    Allergies:  No Known Allergies    Medications:  PRN:  ALBUTerol    90 MICROgram(s) HFA Inhaler 2 Puff(s) Inhalation every 6 hours PRN Shortness of Breath and/or Wheezing  hydrOXYzine hydrochloride 25 milliGRAM(s) Oral every 8 hours PRN Anxiety  LORazepam     Tablet 2 milliGRAM(s) Oral every 4 hours PRN Symptom-triggered: each CIWA -Ar score 8 or GREATER    Standing:  aspirin  chewable 81 milliGRAM(s) Oral daily  atorvastatin 20 milliGRAM(s) Oral at bedtime  chlorhexidine 4% Liquid 1 Application(s) Topical <User Schedule>  dextrose 40% Gel 15 Gram(s) Oral once  dextrose 40% Gel 15 Gram(s) Oral once  dextrose 5%. 1000 milliLiter(s) (50 mL/Hr) IV Continuous <Continuous>  dextrose 5%. 1000 milliLiter(s) (50 mL/Hr) IV Continuous <Continuous>  dextrose 5%. 1000 milliLiter(s) (100 mL/Hr) IV Continuous <Continuous>  dextrose 50% Injectable 25 Gram(s) IV Push once  dextrose 50% Injectable 12.5 Gram(s) IV Push once  dextrose 50% Injectable 25 Gram(s) IV Push once  enoxaparin Injectable 40 milliGRAM(s) SubCutaneous at bedtime  folic acid 1 milliGRAM(s) Oral daily  glucagon  Injectable 1 milliGRAM(s) IntraMuscular once  insulin glargine Injectable (LANTUS) 20 Unit(s) SubCutaneous at bedtime  insulin lispro (ADMELOG) corrective regimen sliding scale   SubCutaneous three times a day before meals  insulin lispro Injectable (ADMELOG) 10 Unit(s) SubCutaneous three times a day before meals  LORazepam     Tablet 1 milliGRAM(s) Oral every 8 hours  magnesium oxide 400 milliGRAM(s) Oral once  melatonin 5 milliGRAM(s) Oral at bedtime  multivitamin 1 Tablet(s) Oral daily  nicotine  Polacrilex Gum 2 milliGRAM(s) Oral every 4 hours  pantoprazole    Tablet 40 milliGRAM(s) Oral before breakfast  sodium chloride 0.9%. 1000 milliLiter(s) (100 mL/Hr) IV Continuous <Continuous>  thiamine 100 milliGRAM(s) Oral at bedtime    Home:    Vitals:  T(C): 35.5, Max: 36.4 (02-05-21 @ 16:00)  T(F): 95.9, Max: 97.5 (02-05-21 @ 16:00)  HR: 75 (75 - 100)  BP: 93/67 (87/51 - 97/67)  RR: 18 (18 - 19)  SpO2: 97% (97% - 97%)    Physical Exam:  General: Cachectic, NAD  HEENT: Head NC/AT  Heart: RRR; S1/S2; no rubs, murmurs appreciated  Lungs: Clear to auscultation bilaterally, no wheezing, rales or rhonchi  Abdomen: Soft, nontender, nondistended, BS+  Extremities: No edema, clubbing, cyanosis in upper or lower extremities.  Neuro: AAOx3, NFD.    Labs:  CBC (02-06 @ 04:30)                        Hgb: 11.2   WBC: 3.97  )-----------------( Plts: 224                              Hct: 38.3     Chem (02-05 @ 18:58)  Na: 129  |     Cl: 97     |  BUN: 11  -----------------------------------------< Gluc: 663    K: 5.2   |    HCO3: 25  |  Cr: 0.6    Ca 8.4 (02-05 @ 18:58)  Mg 1.7 (02-05 @ 05:46)    LFTs (02-05 @ 05:46)  TPro 5.5  /  Alb 2.7  TBili <0.2  /  DBili     AST 57  /  ALT 29  /  AlkPhos 75    Cardiac Markers (02-04 @ 05:40)  Troponin I X  Troponin T <0.01  CK X  CKMB X  CKMB Units X  Myoglobin X  Lactate X  ESR X    Cardiac Markers (02-03 @ 16:57)  Troponin I X  Troponin T <0.01  CK 25  CKMB X  CKMB Units 1.3  Myoglobin X  Lactate X  ESR 32    Cardiac Markers (02-03 @ 04:44)  Troponin I X  Troponin T <0.01  CK X  CKMB X  CKMB Units X  Myoglobin X  Lactate X  ESR X            Microbiology:  Culture - Urine (collected 02-04 @ 19:40)  Source: .Urine Clean Catch (Midstream)  Preliminary Report (02-05 @ 18:47):    10,000 - 49,000 CFU/mL Yeast like cells    Culture - Blood (collected 02-03 @ 16:55)  Source: .Blood Blood  Preliminary Report (02-05 @ 01:02):    No growth to date.        Radiology:  
  Patient Information:  DUKE MARTÍNEZ / 55y / Female / MRN#:[id number]    Hospital Day: 2d    Interval History:  Patient seen and examined at bedside. No acute events overnight.    Past Medical History:  Liver cirrhosis    Diabetes mellitus    Hypertension      Past Surgical History:    Allergies:  No Known Allergies    Medications:  PRN:  ALBUTerol    90 MICROgram(s) HFA Inhaler 2 Puff(s) Inhalation every 6 hours PRN Shortness of Breath and/or Wheezing  hydrOXYzine hydrochloride 25 milliGRAM(s) Oral every 8 hours PRN Anxiety  LORazepam     Tablet 2 milliGRAM(s) Oral every 4 hours PRN Symptom-triggered: each CIWA -Ar score 8 or GREATER    Standing:  chlorhexidine 4% Liquid 1 Application(s) Topical <User Schedule>  dextrose 40% Gel 15 Gram(s) Oral once  dextrose 5%. 1000 milliLiter(s) (50 mL/Hr) IV Continuous <Continuous>  dextrose 5%. 1000 milliLiter(s) (100 mL/Hr) IV Continuous <Continuous>  dextrose 50% Injectable 25 Gram(s) IV Push once  dextrose 50% Injectable 12.5 Gram(s) IV Push once  dextrose 50% Injectable 25 Gram(s) IV Push once  enoxaparin Injectable 40 milliGRAM(s) SubCutaneous at bedtime  folic acid 1 milliGRAM(s) Oral daily  glucagon  Injectable 1 milliGRAM(s) IntraMuscular once  guaifenesin/dextromethorphan  Syrup 10 milliLiter(s) Oral every 4 hours  insulin glargine Injectable (LANTUS) 20 Unit(s) SubCutaneous at bedtime  magnesium oxide 400 milliGRAM(s) Oral once  multivitamin 1 Tablet(s) Oral daily  nicotine  Polacrilex Gum 2 milliGRAM(s) Oral every 4 hours  nicotine - 21 mG/24Hr(s) Patch 1 patch Transdermal daily  pantoprazole    Tablet 40 milliGRAM(s) Oral before breakfast  thiamine 100 milliGRAM(s) Oral at bedtime    Home:    Vitals:  T(C): 35.7, Max: 36.3 (02-04-21 @ 15:40)  T(F): 96.2, Max: 97.3 (02-04-21 @ 15:40)  HR: 75 (75 - 83)  BP: 108/77 (97/68 - 108/77)  RR: 18 (18 - 18)  SpO2: 99% (99% - 99%)    Physical Exam:  General: Cachectic, NAD  HEENT: Head NC/AT  Heart: RRR; S1/S2; no rubs, murmurs appreciated  Lungs: Clear to auscultation bilaterally, no wheezing, rales or rhonchi  Abdomen: Soft, nontender, nondistended, BS+  Extremities: No edema, clubbing, cyanosis in upper or lower extremities.  Neuro: AAOx3, NFD.    Labs:  CBC (02-05 @ 05:46)                        Hgb: 10.7   WBC: 3.45  )-----------------( Plts: 212                              Hct: 35.7     Chem (02-05 @ 05:46)  Na: 138  |     Cl: 108     |  BUN: 7   -----------------------------------------< Gluc: 212    K: 4.7   |    HCO3: 21  |  Cr: <0.5    Ca 7.6 (02-05 @ 05:46)  Phos 2.6 (02-04 @ 10:53)  Mg 1.7 (02-05 @ 05:46)    LFTs (02-05 @ 05:46)  TPro 5.5  /  Alb 2.7  TBili <0.2  /  DBili     AST 57  /  ALT 29  /  AlkPhos 75    Cardiac Markers (02-04 @ 05:40)  Troponin I X  Troponin T <0.01  CK X  CKMB X  CKMB Units X  Myoglobin X  Lactate X  ESR X    Cardiac Markers (02-03 @ 16:57)  Troponin I X  Troponin T <0.01  CK 25  CKMB X  CKMB Units 1.3  Myoglobin X  Lactate X  ESR 32    Cardiac Markers (02-03 @ 04:44)  Troponin I X  Troponin T <0.01  CK X  CKMB X  CKMB Units X  Myoglobin X  Lactate X  ESR X        PT/INR (02-04 @ 05:40)  PT: 10.90; INR: 0.95   PTT:                  Microbiology:  Culture - Blood (collected 02-03 @ 16:55)  Source: .Blood Blood  Preliminary Report (02-05 @ 01:02):    No growth to date.        Radiology:  
  Patient Information:  DUKE MARTÍNEZ / 55y / Female / MRN#:[id number]    Hospital Day: 5d    Interval History:  Patient seen and examined at bedside. No acute events overnight. Pt sitting up in bed, asking for "something" for her alcohol withdrawal. Has no evidence of alcohol withdrawal at this time.  Pt xray revealed lesion on R 2nd rib, suspicious for fracture vs metastatic lesion. Pt states she is agreeable to PET scan, CT chest and abdomen as long as she gets lunch    Past Medical History:  Liver cirrhosis    Diabetes mellitus    Hypertension      Past Surgical History:    Allergies:  No Known Allergies    Medications:  PRN:  acetaminophen   Tablet .. 650 milliGRAM(s) Oral every 6 hours PRN Mild Pain (1 - 3), Moderate Pain (4 - 6)  ALBUTerol    90 MICROgram(s) HFA Inhaler 2 Puff(s) Inhalation every 6 hours PRN Shortness of Breath and/or Wheezing  hydrOXYzine hydrochloride 25 milliGRAM(s) Oral every 8 hours PRN Anxiety  LORazepam     Tablet 2 milliGRAM(s) Oral every 4 hours PRN Symptom-triggered: each CIWA -Ar score 8 or GREATER    Standing:  aspirin  chewable 81 milliGRAM(s) Oral daily  atorvastatin 20 milliGRAM(s) Oral at bedtime  chlorhexidine 4% Liquid 1 Application(s) Topical <User Schedule>  dextrose 40% Gel 15 Gram(s) Oral once  dextrose 40% Gel 15 Gram(s) Oral once  dextrose 5%. 1000 milliLiter(s) (50 mL/Hr) IV Continuous <Continuous>  dextrose 5%. 1000 milliLiter(s) (50 mL/Hr) IV Continuous <Continuous>  dextrose 5%. 1000 milliLiter(s) (100 mL/Hr) IV Continuous <Continuous>  dextrose 50% Injectable 25 Gram(s) IV Push once  dextrose 50% Injectable 12.5 Gram(s) IV Push once  dextrose 50% Injectable 25 Gram(s) IV Push once  enoxaparin Injectable 40 milliGRAM(s) SubCutaneous at bedtime  folic acid 1 milliGRAM(s) Oral daily  glucagon  Injectable 1 milliGRAM(s) IntraMuscular once  insulin glargine Injectable (LANTUS) 20 Unit(s) SubCutaneous at bedtime  insulin lispro (ADMELOG) corrective regimen sliding scale   SubCutaneous three times a day before meals  insulin lispro Injectable (ADMELOG) 10 Unit(s) SubCutaneous three times a day before meals  iohexol 300 mG (iodine)/mL Oral Solution 30 milliLiter(s) Oral once  magnesium oxide 400 milliGRAM(s) Oral once  melatonin 5 milliGRAM(s) Oral at bedtime  multivitamin 1 Tablet(s) Oral daily  nicotine  Polacrilex Gum 2 milliGRAM(s) Oral every 4 hours  ondansetron Injectable 4 milliGRAM(s) IV Push once  pantoprazole    Tablet 40 milliGRAM(s) Oral before breakfast  thiamine 100 milliGRAM(s) Oral at bedtime    Home:    Vitals:  T(C): 35.6, Max: 36.4 (02-07-21 @ 23:56)  T(F): 96.1, Max: 97.6 (02-07-21 @ 23:56)  HR: 89 (84 - 91)  BP: 132/79 (98/61 - 132/79)  RR: 18 (18 - 18)  SpO2: --    Physical Exam:  General: Awake, alert, NAD, sitting up in bed, on RA, agitated at times, answers most questions  HEENT: Head NC/AT  Heart: RRR; S1/S2; no rubs, murmurs appreciated  Lungs: Clear to auscultation bilaterally, no wheezing, rales or rhonchi  Abdomen: Soft, nontender, nondistended, BS+  Extremities: No edema, clubbing, cyanosis in upper or lower extremities  Neuro: AAOx3, NFD    Labs:  CBC (02-08 @ 05:56)                        Hgb: 9.1    WBC: 3.82  )-----------------( Plts: 202                              Hct: 30.2     Chem (02-08 @ 05:56)  Na: 136  |     Cl: 103     |  BUN: 8   -----------------------------------------< Gluc: 237    K: 4.7   |    HCO3: 22  |  Cr: 0.5    Ca 8.1 (02-08 @ 05:56)  Mg 1.5 (02-08 @ 05:56)    LFTs (02-08 @ 05:56)  TPro 5.1  /  Alb 2.6  TBili <0.2  /  DBili     AST 38  /  ALT 31  /  AlkPhos 63            Microbiology:  Culture - Urine (collected 02-04 @ 19:40)  Source: .Urine Clean Catch (Midstream)  Final Report (02-06 @ 19:37):    10,000 - 49,000 CFU/mL Presumptive Candida albicans    "Susceptibilities not performed"    Culture - Blood (collected 02-03 @ 16:55)  Source: .Blood Blood  Preliminary Report (02-05 @ 01:02):    No growth to date.        Radiology:  
  Patient Information:  DUKE MARTÍNEZ / 55y / Female / MRN#:[id number]    Hospital Day: 1d    Interval History:  Patient seen and examined at bedside. No acute events overnight.    Past Medical History:  Liver cirrhosis    Diabetes mellitus    Hypertension      Past Surgical History:    Allergies:  No Known Allergies    Medications:  PRN:  ALBUTerol    90 MICROgram(s) HFA Inhaler 2 Puff(s) Inhalation every 6 hours PRN Shortness of Breath and/or Wheezing  LORazepam     Tablet 2 milliGRAM(s) Oral every 4 hours PRN Symptom-triggered: each CIWA -Ar score 8 or GREATER    Standing:  cefTRIAXone   IVPB 1000 milliGRAM(s) IV Intermittent every 24 hours  chlorhexidine 4% Liquid 1 Application(s) Topical <User Schedule>  dextrose 40% Gel 15 Gram(s) Oral once  dextrose 5%. 1000 milliLiter(s) (50 mL/Hr) IV Continuous <Continuous>  dextrose 5%. 1000 milliLiter(s) (100 mL/Hr) IV Continuous <Continuous>  dextrose 50% Injectable 25 Gram(s) IV Push once  dextrose 50% Injectable 12.5 Gram(s) IV Push once  dextrose 50% Injectable 25 Gram(s) IV Push once  enoxaparin Injectable 40 milliGRAM(s) SubCutaneous at bedtime  fluconAZOLE   Tablet 150 milliGRAM(s) Oral once  folic acid 1 milliGRAM(s) Oral daily  glucagon  Injectable 1 milliGRAM(s) IntraMuscular once  guaifenesin/dextromethorphan  Syrup 10 milliLiter(s) Oral every 4 hours  insulin glargine Injectable (LANTUS) 20 Unit(s) SubCutaneous at bedtime  multivitamin 1 Tablet(s) Oral daily  nicotine - 21 mG/24Hr(s) Patch 1 patch Transdermal daily  pantoprazole    Tablet 40 milliGRAM(s) Oral before breakfast  potassium chloride  20 mEq/100 mL IVPB 20 milliEquivalent(s) IV Intermittent every 2 hours  sodium chloride 0.9%. 1000 milliLiter(s) (100 mL/Hr) IV Continuous <Continuous>  thiamine 100 milliGRAM(s) Oral at bedtime    Home:    Vitals:  T(C): 36.7, Max: 36.8 (21 @ 17:18)  T(F): 98.1, Max: 98.3 (21 @ 17:18)  HR: 69 (66 - 82)  BP: 91/60 (87/60 - 103/60)  RR: 18 (18 - 18)  SpO2: 100% (98% - 100%)    Physical Exam:  General: Cachectic, ill-appearing  HEENT: Head NC/AT  Heart: RRR; S1/S2; no rubs, murmurs appreciated  Lungs: Clear to auscultation bilaterally, no wheezing, rales or rhonchi  Abdomen: Soft, nontender, nondistended, BS+  Extremities: No edema, clubbing, cyanosis in upper or lower extremities.  Neuro: AAOx3    Labs:  CBC ( @ 05:40)                        Hgb: 10.1   WBC: 6.28  )-----------------( Plts: 246                              Hct: 33.3     Chem ( @ 05:40)  Na: 141  |     Cl: 107     |  BUN: 7   -----------------------------------------< Gluc: 36    K: 2.8   |    HCO3: 24  |  Cr: <0.5    Ca 7.6 ( @ 05:40)  Phos 2.6 ( @ 10:53)  Mg 1.9 ( @ 10:53)    LFTs ( @ 05:40)  TPro 5.3  /  Alb 2.9  TBili <0.2  /  DBili     AST 51  /  ALT 26  /  AlkPhos 81    Cardiac Markers ( @ 05:40)  Troponin I X  Troponin T <0.01  CK X  CKMB X  CKMB Units X  Myoglobin X  Lactate X  ESR X    Cardiac Markers ( @ 16:57)  Troponin I X  Troponin T <0.01  CK 25  CKMB X  CKMB Units 1.3  Myoglobin X  Lactate X  ESR 32    Cardiac Markers ( @ 04:44)  Troponin I X  Troponin T <0.01  CK X  CKMB X  CKMB Units X  Myoglobin X  Lactate X  ESR X        PT/INR ( @ 05:40)  PT: 10.90; INR: 0.95   PTT:                Urinalysis Basic ( @ 04:44)  Color: Light Yellow  Appearance: Clear  S.036  pH:   Gluc:   Ketone: Trace  Bili: Negative  Urobili: <2 mg/dL   Blood:   Protein: Negative  Nitrite: Negative   Leuk Esterase: Large  RBC: 3  WBC: 45   Sq Epi:   Non Sq Epi: 1  Bacteria: Moderate    Microbiology:    Radiology:  
  Patient Information:  DUKE MARTÍNEZ / 55y / Female / MRN#:[id number]    Hospital Day: 6d    Interval History:  Patient seen and examined at bedside. Pt resting in bed, was to have CT chest and abdomen done overnight and IV was not functioning. IV placed and pt agreeable to attempt CT again today.    Past Medical History:  Liver cirrhosis    Diabetes mellitus    Hypertension      Past Surgical History:    Allergies:  No Known Allergies    Medications:  PRN:  acetaminophen   Tablet .. 650 milliGRAM(s) Oral every 6 hours PRN Mild Pain (1 - 3), Moderate Pain (4 - 6)  ALBUTerol    90 MICROgram(s) HFA Inhaler 2 Puff(s) Inhalation every 6 hours PRN Shortness of Breath and/or Wheezing  hydrOXYzine hydrochloride 25 milliGRAM(s) Oral every 8 hours PRN Anxiety  LORazepam     Tablet 2 milliGRAM(s) Oral every 4 hours PRN Symptom-triggered: each CIWA -Ar score 8 or GREATER    Standing:  aspirin  chewable 81 milliGRAM(s) Oral daily  atorvastatin 20 milliGRAM(s) Oral at bedtime  chlorhexidine 4% Liquid 1 Application(s) Topical <User Schedule>  dextrose 40% Gel 15 Gram(s) Oral once  dextrose 40% Gel 15 Gram(s) Oral once  dextrose 5%. 1000 milliLiter(s) (50 mL/Hr) IV Continuous <Continuous>  dextrose 5%. 1000 milliLiter(s) (50 mL/Hr) IV Continuous <Continuous>  dextrose 5%. 1000 milliLiter(s) (100 mL/Hr) IV Continuous <Continuous>  dextrose 50% Injectable 25 Gram(s) IV Push once  dextrose 50% Injectable 12.5 Gram(s) IV Push once  dextrose 50% Injectable 25 Gram(s) IV Push once  enoxaparin Injectable 40 milliGRAM(s) SubCutaneous at bedtime  folic acid 1 milliGRAM(s) Oral daily  glucagon  Injectable 1 milliGRAM(s) IntraMuscular once  insulin glargine Injectable (LANTUS) 20 Unit(s) SubCutaneous at bedtime  insulin lispro (ADMELOG) corrective regimen sliding scale   SubCutaneous three times a day before meals  insulin lispro Injectable (ADMELOG) 10 Unit(s) SubCutaneous three times a day before meals  magnesium oxide 400 milliGRAM(s) Oral once  magnesium sulfate  IVPB 2 Gram(s) IV Intermittent once  melatonin 5 milliGRAM(s) Oral at bedtime  multivitamin 1 Tablet(s) Oral daily  nicotine  Polacrilex Gum 2 milliGRAM(s) Oral every 4 hours  ondansetron Injectable 4 milliGRAM(s) IV Push once  pantoprazole    Tablet 40 milliGRAM(s) Oral before breakfast  thiamine 100 milliGRAM(s) Oral at bedtime    Home:    Vitals:  T(C): 35.8, Max: 36.2 (02-09-21 @ 00:48)  T(F): 96.4, Max: 97.2 (02-09-21 @ 00:48)  HR: 77 (74 - 89)  BP: 141/78 (100/59 - 141/78)  RR: 18 (18 - 18)  SpO2: --    Physical Exam:  General: Awake, alert, NAD, sitting up in bed, on RA, calm and pleasant  HEENT: Head NC/AT  Heart: RRR; S1/S2; no rubs, murmurs appreciated  Lungs: Clear to auscultation bilaterally, no wheezing, rales or rhonchi  Abdomen: Soft, nontender, nondistended, BS+  Extremities: No edema, clubbing, cyanosis in upper or lower extremities  Neuro: AAOx3, NFD    Labs:  CBC (02-09 @ 05:56)                        Hgb: 9.5    WBC: 3.75  )-----------------( Plts: 206                              Hct: 30.8     Chem (02-09 @ 05:56)  Na: 135  |     Cl: 103     |  BUN: 8   -----------------------------------------< Gluc: 151    K: 4.9   |    HCO3: 25  |  Cr: 0.6    Ca 8.3 (02-09 @ 05:56)  Mg 1.6 (02-09 @ 05:56)    LFTs (02-09 @ 05:56)  TPro 5.0  /  Alb 2.7  TBili <0.2  /  DBili     AST 48  /  ALT 33  /  AlkPhos 63            Microbiology:  Culture - Urine (collected 02-04 @ 19:40)  Source: .Urine Clean Catch (Midstream)  Final Report (02-06 @ 19:37):    10,000 - 49,000 CFU/mL Presumptive Candida albicans    "Susceptibilities not performed"    Culture - Blood (collected 02-03 @ 16:55)  Source: .Blood Blood  Final Report (02-09 @ 01:00):    No Growth Final        Radiology:  
DUKE MARTÍNEZ    Patient is a 55y old  Female who presents with a chief complaint of Cough (05 Feb 2021 12:12)    INTERVAL HPI/OVERNIGHT EVENTS: pt pulled IV line multiple times overnight, she refused CT scan. NO active complaints. Pt is interested in inpatient rehab.     CONSTITUTIONAL: No weakness, fevers or chills  EYES/ENT: No visual changes;  No vertigo or throat pain   NECK: No pain or stiffness  RESPIRATORY: +chronic cough  CARDIOVASCULAR: No chest pain or palpitations  GASTROINTESTINAL: No abdominal or epigastric pain. No nausea, vomiting, or hematemesis; No diarrhea or constipation. No melena or hematochezia.  GENITOURINARY: No dysuria, frequency or hematuria  NEUROLOGICAL: No numbness or weakness  SKIN: No itching, rashes     PHYSICAL EXAM:  T(C): 35.7, Max: 36.3 (02-04-21 @ 15:40)  HR: 75 (75 - 83)  BP: 108/77 (97/68 - 108/77)  RR: 18 (18 - 18)  SpO2: 99% (99% - 99%)    GENERAL: NAD, cachectic   NECK: Supple, No JVD, no LN   PULMONARY/CHEST: No rales, rhonchi, wheezing  CARDIOVASC: Regular rate and rhythm; No murmurs  GI/ABDOMEN: Soft, Nontender, Nondistended; Bowel sounds present  EXTREMITIES:  no clubbing, cyanosis, edema or calf tenderness b/l  NERVOUS SYSTEM:  Alert & Oriented X3, no focal deficit     LABS:                        10.7   3.45  )-----------( 212      ( 05 Feb 2021 05:46 )             35.7     02-05    138  |  108  |  7<L>  ----------------------------<  212<H>  4.7   |  21  |  <0.5<L>    Ca    7.6<L>      05 Feb 2021 05:46  Phos  2.6     02-04  Mg     1.7     02-05    TPro  5.5<L>  /  Alb  2.7<L>  /  TBili  <0.2  /  DBili  x   /  AST  57<H>  /  ALT  29  /  AlkPhos  75  02-05    PT/INR - ( 04 Feb 2021 05:40 )   PT: 10.90 sec;   INR: 0.95 ratio      CAPILLARY BLOOD GLUCOSE  POCT Blood Glucose.: 157 mg/dL (04 Feb 2021 21:45)  POCT Blood Glucose.: 149 mg/dL (04 Feb 2021 16:35)      Culture - Blood (collected 03 Feb 2021 16:55)  Source: .Blood Blood  Preliminary Report (05 Feb 2021 01:02):    No growth to date.      MEDICATIONS  (STANDING):  cefTRIAXone   IVPB 1000 milliGRAM(s) IV Intermittent every 24 hours  chlorhexidine 4% Liquid 1 Application(s) Topical <User Schedule>  dextrose 40% Gel 15 Gram(s) Oral once  dextrose 5%. 1000 milliLiter(s) (50 mL/Hr) IV Continuous <Continuous>  dextrose 5%. 1000 milliLiter(s) (100 mL/Hr) IV Continuous <Continuous>  dextrose 50% Injectable 25 Gram(s) IV Push once  dextrose 50% Injectable 12.5 Gram(s) IV Push once  dextrose 50% Injectable 25 Gram(s) IV Push once  enoxaparin Injectable 40 milliGRAM(s) SubCutaneous at bedtime  folic acid 1 milliGRAM(s) Oral daily  glucagon  Injectable 1 milliGRAM(s) IntraMuscular once  guaifenesin/dextromethorphan  Syrup 10 milliLiter(s) Oral every 4 hours  insulin glargine Injectable (LANTUS) 20 Unit(s) SubCutaneous at bedtime  magnesium oxide 400 milliGRAM(s) Oral once  multivitamin 1 Tablet(s) Oral daily  nicotine - 21 mG/24Hr(s) Patch 1 patch Transdermal daily  pantoprazole    Tablet 40 milliGRAM(s) Oral before breakfast  thiamine 100 milliGRAM(s) Oral at bedtime    MEDICATIONS  (PRN):  ALBUTerol    90 MICROgram(s) HFA Inhaler 2 Puff(s) Inhalation every 6 hours PRN Shortness of Breath and/or Wheezing  hydrOXYzine hydrochloride 25 milliGRAM(s) Oral every 8 hours PRN Anxiety  LORazepam     Tablet 2 milliGRAM(s) Oral every 4 hours PRN Symptom-triggered: each CIWA -Ar score 8 or GREATER      
DUKE MARTÍNEZ    Patient is a 55y old  Female who presents with a chief complaint of Cough (05 Feb 2021 15:14)    INTERVAL HPI/OVERNIGHT EVENTS: pt was hyperglycemic yesterday, then early this morning became hypoglycemic, she fell next to her bed this morning when she tried to get up, no LOC, did not hit her head, no pain anywhere.     ROS: All ROS negative except as documented above     PHYSICAL EXAM:  T(C): 35.5, Max: 36.4 (02-05-21 @ 16:00)  HR: 75 (75 - 100)  BP: 93/67 (87/51 - 97/67)  RR: 18 (18 - 19)  SpO2: 97% (97% - 97%)    GENERAL: NAD, cachectic  PULMONARY/CHEST: No rales, rhonchi, or wheezing  CARDIOVASC: Regular rate and rhythm; No murmurs  GI/ABDOMEN: Soft, Nontender, Nondistended; Bowel sounds present  EXTREMITIES:  2+ Peripheral Pulses, No clubbing, cyanosis, or edema, no deformity. No calf tenderness b/l.  NERVOUS SYSTEM:  Alert & Oriented X3, no focal deficit     LABS:                        11.2   3.97  )-----------( 224      ( 06 Feb 2021 04:30 )             38.3       CAPILLARY BLOOD GLUCOSE  POCT Blood Glucose.: 217 mg/dL (06 Feb 2021 08:36)  POCT Blood Glucose.: 174 mg/dL (06 Feb 2021 05:30)  POCT Blood Glucose.: 47 mg/dL (06 Feb 2021 04:33)  POCT Blood Glucose.: 308 mg/dL (05 Feb 2021 23:19)  POCT Blood Glucose.: 431 mg/dL (05 Feb 2021 21:30)  POCT Blood Glucose.: 558 mg/dL (05 Feb 2021 19:27)  POCT Blood Glucose.: >600 mg/dL (05 Feb 2021 18:12)  POCT Blood Glucose.: >600 mg/dL (05 Feb 2021 17:45)  POCT Blood Glucose.: >600 mg/dL (05 Feb 2021 16:48)  POCT Blood Glucose.: >600 mg/dL (05 Feb 2021 16:40)      Culture - Urine (collected 04 Feb 2021 19:40)  Source: .Urine Clean Catch (Midstream)  Preliminary Report (05 Feb 2021 18:47):    10,000 - 49,000 CFU/mL Yeast like cells    Culture - Blood (collected 03 Feb 2021 16:55)  Source: .Blood Blood  Preliminary Report (05 Feb 2021 01:02):    No growth to date.      RADIOLOGY & ADDITIONAL TESTS:  no new tests      MEDICATIONS  (STANDING):  aspirin  chewable 81 milliGRAM(s) Oral daily  atorvastatin 20 milliGRAM(s) Oral at bedtime  chlorhexidine 4% Liquid 1 Application(s) Topical <User Schedule>  dextrose 40% Gel 15 Gram(s) Oral once  dextrose 40% Gel 15 Gram(s) Oral once  dextrose 5%. 1000 milliLiter(s) (50 mL/Hr) IV Continuous <Continuous>  dextrose 5%. 1000 milliLiter(s) (50 mL/Hr) IV Continuous <Continuous>  dextrose 5%. 1000 milliLiter(s) (100 mL/Hr) IV Continuous <Continuous>  dextrose 50% Injectable 25 Gram(s) IV Push once  dextrose 50% Injectable 12.5 Gram(s) IV Push once  dextrose 50% Injectable 25 Gram(s) IV Push once  enoxaparin Injectable 40 milliGRAM(s) SubCutaneous at bedtime  folic acid 1 milliGRAM(s) Oral daily  glucagon  Injectable 1 milliGRAM(s) IntraMuscular once  insulin glargine Injectable (LANTUS) 20 Unit(s) SubCutaneous at bedtime  insulin lispro (ADMELOG) corrective regimen sliding scale   SubCutaneous three times a day before meals  LORazepam     Tablet 1 milliGRAM(s) Oral every 8 hours  magnesium oxide 400 milliGRAM(s) Oral once  melatonin 5 milliGRAM(s) Oral at bedtime  multivitamin 1 Tablet(s) Oral daily  nicotine  Polacrilex Gum 2 milliGRAM(s) Oral every 4 hours  pantoprazole    Tablet 40 milliGRAM(s) Oral before breakfast  sodium chloride 0.9%. 1000 milliLiter(s) (100 mL/Hr) IV Continuous <Continuous>  thiamine 100 milliGRAM(s) Oral at bedtime    MEDICATIONS  (PRN):  ALBUTerol    90 MICROgram(s) HFA Inhaler 2 Puff(s) Inhalation every 6 hours PRN Shortness of Breath and/or Wheezing  hydrOXYzine hydrochloride 25 milliGRAM(s) Oral every 8 hours PRN Anxiety  LORazepam     Tablet 2 milliGRAM(s) Oral every 4 hours PRN Symptom-triggered: each CIWA -Ar score 8 or GREATER      
DUKE MARTÍNEZ    Patient is a 55y old  Female who presents with a chief complaint of Cough (08 Feb 2021 13:41)    INTERVAL HPI/OVERNIGHT EVENTS: No events overnight.    ROS: All ROS negative     PHYSICAL EXAM:  T(C): 36.1, Max: 36.4 (02-07-21 @ 23:56)  HR: 83 (83 - 91)  BP: 126/79 (98/61 - 132/79)  RR: 18 (18 - 18)  SpO2: --    GENERAL: NAD, cachectic   PULMONARY/CHEST: No rales, rhonchi, wheezing  CARDIOVASC: Regular rate and rhythm; No murmurs  GI/ABDOMEN: Soft, Nontender, Nondistended; Bowel sounds present  EXTREMITIES:  2+ Peripheral Pulses, No clubbing, cyanosis, or edema, no deformity. No calf tenderness b/l.  NERVOUS SYSTEM:  Alert & Oriented X3, no focal deficit     LABS:                        9.1    3.82  )-----------( 202      ( 08 Feb 2021 05:56 )             30.2     02-08    136  |  103  |  8<L>  ----------------------------<  237<H>  4.7   |  22  |  0.5<L>    Ca    8.1<L>      08 Feb 2021 05:56  Mg     1.5     02-08    TPro  5.1<L>  /  Alb  2.6<L>  /  TBili  <0.2  /  DBili  x   /  AST  38  /  ALT  31  /  AlkPhos  63  02-08      CAPILLARY BLOOD GLUCOSE  POCT Blood Glucose.: 59 mg/dL (08 Feb 2021 16:32)  POCT Blood Glucose.: 107 mg/dL (08 Feb 2021 11:53)  POCT Blood Glucose.: 216 mg/dL (08 Feb 2021 08:33)  POCT Blood Glucose.: 323 mg/dL (07 Feb 2021 21:10)      RADIOLOGY & ADDITIONAL TESTS:  < from: NM Bone Imaging Total (02.08.21 @ 15:45) >  Impression:  No definite evidence of metastatic bone disease.  Nonspecific focal bone agent uptake at the right lateral sixth and 10th ribs. Correlation with right rib radiographs may be obtained.      < end of copied text >  < from: Xray Shoulder 2 Views, Left (02.06.21 @ 21:57) >  Findings/  impression: Left second rib fracture reidentified. Otherwise no glenohumeral fracture dislocation. Calcific tendinosis at insertion of rotator cuff with greater tubercle sclerosis. Mild glenohumeral and AC joint degenerative change.      < end of copied text >        MEDICATIONS  (STANDING):  aspirin  chewable 81 milliGRAM(s) Oral daily  atorvastatin 20 milliGRAM(s) Oral at bedtime  chlorhexidine 4% Liquid 1 Application(s) Topical <User Schedule>  dextrose 40% Gel 15 Gram(s) Oral once  dextrose 40% Gel 15 Gram(s) Oral once  dextrose 5%. 1000 milliLiter(s) (50 mL/Hr) IV Continuous <Continuous>  dextrose 5%. 1000 milliLiter(s) (50 mL/Hr) IV Continuous <Continuous>  dextrose 5%. 1000 milliLiter(s) (100 mL/Hr) IV Continuous <Continuous>  dextrose 50% Injectable 25 Gram(s) IV Push once  dextrose 50% Injectable 12.5 Gram(s) IV Push once  dextrose 50% Injectable 25 Gram(s) IV Push once  enoxaparin Injectable 40 milliGRAM(s) SubCutaneous at bedtime  folic acid 1 milliGRAM(s) Oral daily  glucagon  Injectable 1 milliGRAM(s) IntraMuscular once  insulin glargine Injectable (LANTUS) 20 Unit(s) SubCutaneous at bedtime  insulin lispro (ADMELOG) corrective regimen sliding scale   SubCutaneous three times a day before meals  insulin lispro Injectable (ADMELOG) 10 Unit(s) SubCutaneous three times a day before meals  iohexol 300 mG (iodine)/mL Oral Solution 30 milliLiter(s) Oral once  magnesium oxide 400 milliGRAM(s) Oral once  melatonin 5 milliGRAM(s) Oral at bedtime  multivitamin 1 Tablet(s) Oral daily  nicotine  Polacrilex Gum 2 milliGRAM(s) Oral every 4 hours  ondansetron Injectable 4 milliGRAM(s) IV Push once  pantoprazole    Tablet 40 milliGRAM(s) Oral before breakfast  thiamine 100 milliGRAM(s) Oral at bedtime    MEDICATIONS  (PRN):  acetaminophen   Tablet .. 650 milliGRAM(s) Oral every 6 hours PRN Mild Pain (1 - 3), Moderate Pain (4 - 6)  ALBUTerol    90 MICROgram(s) HFA Inhaler 2 Puff(s) Inhalation every 6 hours PRN Shortness of Breath and/or Wheezing  hydrOXYzine hydrochloride 25 milliGRAM(s) Oral every 8 hours PRN Anxiety  LORazepam     Tablet 2 milliGRAM(s) Oral every 4 hours PRN Symptom-triggered: each CIWA -Ar score 8 or GREATER      
DUKE MARTÍNEZ    Patient is a 55y old  Female who presents with a chief complaint of Cough (06 Feb 2021 13:10)    INTERVAL HPI/OVERNIGHT EVENTS: pt fell last night again, was complaining of pain in left arm, hip, but today no complaints.     ROS: All ROS negative     PHYSICAL EXAM:  T(C): 36.4, Max: 36.4 (02-07-21 @ 08:00)  HR: 72 (72 - 80)  BP: 91/70 (91/70 - 110/81)  RR: 18 (18 - 19)  SpO2: --    GENERAL: NAD, cachectic  PULMONARY/CHEST: No rales, rhonchi, wheezing  CARDIOVASC: Regular rate and rhythm; No murmurs  GI/ABDOMEN: Soft, Nontender, Nondistended; Bowel sounds present  EXTREMITIES:  2+ Peripheral Pulses, No clubbing, cyanosis, or edema, no deformity. No calf tenderness b/l.  NERVOUS SYSTEM:  Alert & Oriented X3, no focal deficit     Consultant(s) Notes Reviewed by me.     LABS:                        10.6   4.19  )-----------( 218      ( 07 Feb 2021 05:48 )             34.7     02-07    133<L>  |  103  |  5<L>  ----------------------------<  138<H>  4.4   |  22  |  <0.5<L>    Ca    8.7      07 Feb 2021 05:48  Mg     1.6     02-07    TPro  5.5<L>  /  Alb  2.6<L>  /  TBili  <0.2  /  DBili  x   /  AST  40  /  ALT  30  /  AlkPhos  66  02-07    CAPILLARY BLOOD GLUCOSE  POCT Blood Glucose.: 292 mg/dL (07 Feb 2021 11:08)  POCT Blood Glucose.: 226 mg/dL (07 Feb 2021 08:00)  POCT Blood Glucose.: 182 mg/dL (06 Feb 2021 22:36)  POCT Blood Glucose.: 187 mg/dL (06 Feb 2021 20:32)  POCT Blood Glucose.: 257 mg/dL (06 Feb 2021 16:27)      Culture - Urine (collected 04 Feb 2021 19:40)  Source: .Urine Clean Catch (Midstream)  Final Report (06 Feb 2021 19:37):    10,000 - 49,000 CFU/mL Presumptive Candida albicans    "Susceptibilities not performed"      RADIOLOGY & ADDITIONAL TESTS:  < from: CT Head No Cont (02.06.21 @ 21:38) >  Impression:  No CT evidence of acute intracranial hemorrhage or large territorial infarct.    Mucosal thickening/air-fluid levels in the maxillary sinuses, differential includes sinusitis.    < end of copied text >        MEDICATIONS  (STANDING):  aspirin  chewable 81 milliGRAM(s) Oral daily  atorvastatin 20 milliGRAM(s) Oral at bedtime  chlorhexidine 4% Liquid 1 Application(s) Topical <User Schedule>  dextrose 40% Gel 15 Gram(s) Oral once  dextrose 40% Gel 15 Gram(s) Oral once  dextrose 5%. 1000 milliLiter(s) (50 mL/Hr) IV Continuous <Continuous>  dextrose 5%. 1000 milliLiter(s) (50 mL/Hr) IV Continuous <Continuous>  dextrose 5%. 1000 milliLiter(s) (100 mL/Hr) IV Continuous <Continuous>  dextrose 50% Injectable 25 Gram(s) IV Push once  dextrose 50% Injectable 12.5 Gram(s) IV Push once  dextrose 50% Injectable 25 Gram(s) IV Push once  enoxaparin Injectable 40 milliGRAM(s) SubCutaneous at bedtime  folic acid 1 milliGRAM(s) Oral daily  glucagon  Injectable 1 milliGRAM(s) IntraMuscular once  insulin glargine Injectable (LANTUS) 20 Unit(s) SubCutaneous at bedtime  insulin lispro (ADMELOG) corrective regimen sliding scale   SubCutaneous three times a day before meals  insulin lispro Injectable (ADMELOG) 10 Unit(s) SubCutaneous three times a day before meals  magnesium oxide 400 milliGRAM(s) Oral once  melatonin 5 milliGRAM(s) Oral at bedtime  multivitamin 1 Tablet(s) Oral daily  nicotine  Polacrilex Gum 2 milliGRAM(s) Oral every 4 hours  ondansetron Injectable 4 milliGRAM(s) IV Push once  pantoprazole    Tablet 40 milliGRAM(s) Oral before breakfast  sodium chloride 0.9%. 1000 milliLiter(s) (100 mL/Hr) IV Continuous <Continuous>  thiamine 100 milliGRAM(s) Oral at bedtime    MEDICATIONS  (PRN):  acetaminophen   Tablet .. 650 milliGRAM(s) Oral every 6 hours PRN Mild Pain (1 - 3), Moderate Pain (4 - 6)  ALBUTerol    90 MICROgram(s) HFA Inhaler 2 Puff(s) Inhalation every 6 hours PRN Shortness of Breath and/or Wheezing  hydrOXYzine hydrochloride 25 milliGRAM(s) Oral every 8 hours PRN Anxiety  LORazepam     Tablet 2 milliGRAM(s) Oral every 4 hours PRN Symptom-triggered: each CIWA -Ar score 8 or GREATER

## 2021-02-09 NOTE — PROGRESS NOTE ADULT - PROVIDER SPECIALTY LIST ADULT
Hospitalist
Internal Medicine
Hospitalist

## 2021-02-09 NOTE — PROGRESS NOTE ADULT - ATTENDING COMMENTS
Agree with resident's note, HPI, PE, assessment and plan.  Pt was seen and examined independently.     Pt refused CT chest/abd/pelvis today again, I again explained high suspicion for malignancy, she stated she will f/u with MD for OP work up.   Pt does not have signs of withdrawal. Pt agreed for discharge home.   Pt is clinically stable for discharge.  OP appointment to [facility] done.

## 2021-02-09 NOTE — PROGRESS NOTE ADULT - ASSESSMENT
54yo female w pmhx of  HTN, DM, Liver cirrhosis, ETOH abuse (last drink yesterday 2/2) and current smoker of 1/2pack/day x 40 years who presents for a productive cough and found to be Hyperglycemic on admission.    #Suspected malignancy  - Given smoking hx, unintentional weight loss, symptoms of night sweats  - CEA elevated 28.5  - Bone scan negative for evidence of metastatic bone disease  - CT Chest an Abdomen pending  - Will refer to outpt GI and MAP clinic for further age approp. cancer screening    #Uncontrolled DM w severe hyperglycemia--resolved  - On admission glucose 947, AG 15, BHB 1.8, hyponatremia   - Pt endorses not taking home DM medications  - Pt was managed with SQ insulin, not requiring insulin drip; FS improved  - HbA1c > 15%  - Diabetic supplies, Janumet, insulin sent to pharmacy; pt states she knows how to inject insulin and check FS    #Multiple mechanical falls during hospital stay  - Pt has fallen multiple times during stay, at times, when witnessed by staff, appears to be deliberately falling  - Has not had HT, LOC, CT Head negative  - R humerus xray revealed R 2nd rib fx, suspicious for fx vs metastatic lesion; pt has no chest pain, no difficulty moving or respirating    #Asymptomatic bacteruria   #PNA ruled out  - UA w large LE and mod bacteria but no leukocytosis or fevers  - Blood Cx negative, UCx grew Candida, likely contaminant  - >40 years hx of smoking; CXR w hyperinflated lungs but otherwise neg  - s/p ABx tx with Ceftriaxone, Azithromycin  - Outpatient pulm follow up    #Alcohol dependence disorder w suspected folate and thiamine deficiency  #h/o liver cirrhosis  - Last drink day before admission  - Not in withdrawal  - CIWA PRN  - c/w MVI, thiamine, folate  - RUQ US: hepatic steatosis, contracted GB  - will d/c to inpatient rehab per CATCH team, when cancer workup done    GI ppx: pantoprazole  DVT ppx: Lovenox  Diet: DASH  Activity: IAT  Dispo: Likely dc after CT chest and abdomen complete     54yo female w pmhx of  HTN, DM, Liver cirrhosis, ETOH abuse (last drink yesterday 2/2) and current smoker of 1/2pack/day x 40 years who presents for a productive cough and found to be Hyperglycemic on admission.    #Suspected malignancy  - Given smoking hx, unintentional weight loss, symptoms of night sweats  - HIV negative, quantiferon pending  - CEA elevated 28.5  - Bone scan negative for evidence of metastatic bone disease  - CT Chest an Abdomen pending  - Will refer to outpt GI and MAP clinic for further age approp. cancer screening    #Uncontrolled DM w severe hyperglycemia--resolved  - On admission glucose 947, AG 15, BHB 1.8, hyponatremia   - Pt endorses not taking home DM medications  - Pt was managed with SQ insulin, not requiring insulin drip; FS improved  - HbA1c > 15%  - Diabetic supplies, Janumet, insulin sent to pharmacy; pt states she knows how to inject insulin and check FS    #Multiple mechanical falls during hospital stay  - Pt has fallen multiple times during stay, at times, when witnessed by staff, appears to be deliberately falling  - Has not had HT, LOC, CT Head negative  - R humerus xray revealed R 2nd rib fx, suspicious for fx vs metastatic lesion; pt has no chest pain, no difficulty moving or respirating    #Asymptomatic bacteruria   #PNA ruled out  - UA w large LE and mod bacteria but no leukocytosis or fevers  - Blood Cx negative, UCx grew Candida, likely contaminant  - >40 years hx of smoking; CXR w hyperinflated lungs but otherwise neg  - s/p ABx tx with Ceftriaxone, Azithromycin  - Outpatient pulm follow up    #Alcohol dependence disorder w suspected folate and thiamine deficiency  #h/o liver cirrhosis  - Last drink day before admission  - Not in withdrawal  - CIWA PRN  - c/w MVI, thiamine, folate  - RUQ US: hepatic steatosis, contracted GB  - will d/c to inpatient rehab per CATCH team, when cancer workup done    GI ppx: pantoprazole  DVT ppx: Lovenox  Diet: DASH  Activity: IAT  Dispo: Likely dc after CT chest and abdomen complete

## 2021-02-09 NOTE — CHART NOTE - NSCHARTNOTEFT_GEN_A_CORE
Registered Dietitian Follow-Up     Patient Profile Reviewed                           Yes [x]   No []     Nutrition History Previously Obtained        Yes [x]  No []       Pertinent Subjective Information: Patient is consuming 100% of meals + nutrition oral supplements, no factors affecting appetite at this time.      Pertinent Medical Interventions:  Per internal medicine note:   ---Suspected malignancy; Given smoking hx, unintentional weight loss, symptoms of night sweats; Bone scan negative for evidence of metastatic bone disease  ---Uncontrolled DM w severe hyperglycemia--resolved  ---Alcohol dependence disorder w suspected folate and thiamine      Diet order: Diet, DASH/TLC:   Sodium & Cholesterol Restricted  Consistent Carbohydrate {Evening Snack}     Qty per Day:  STRAWBERRY  Pro[employer] Gelatein 20 Sugar Free     Qty per Day:  2  Supplement Feeding Modality:  Oral  Glucerna Shake Cans or Servings Per Day:  1       Frequency:  Three Times a day (02-09-21 @ 02:29) [Active]     Anthropometrics:  - Ht. 162.6 cm   - Wt. 45 kg dosing wt. No new weights, will continue to monitor weight trend.   - %wt change  - BMI 17  - IBW 54.5 kg      Pertinent Lab Data: 2/9: RBC: 3.69, H/H: 9.5/30.8, BUN:8, Creatinine: 0.6  glucose: 151, corrected calcium: 9.3, AST: 48, Magnesium: 1.6     2/4: HbA1c: 15.5       CAPILLARY BLOOD GLUCOSE  POCT Blood Glucose.: 289 mg/dL (09 Feb 2021 10:56)  POCT Blood Glucose.: 100 mg/dL (09 Feb 2021 07:42)  POCT Blood Glucose.: 170 mg/dL (08 Feb 2021 21:08)  POCT Blood Glucose.: 59 mg/dL (08 Feb 2021 16:32)  POCT Blood Glucose.: 107 mg/dL (08 Feb 2021 11:53)    MEDICATIONS  (STANDING):  aspirin  chewable 81 milliGRAM(s) Oral daily  atorvastatin 20 milliGRAM(s) Oral at bedtime  dextrose 40% Gel 15 Gram(s) Oral once  dextrose 40% Gel 15 Gram(s) Oral once  dextrose 5%. 1000 milliLiter(s) (50 mL/Hr) IV Continuous <Continuous>  dextrose 5%. 1000 milliLiter(s) (50 mL/Hr) IV Continuous <Continuous>  dextrose 5%. 1000 milliLiter(s) (100 mL/Hr) IV Continuous <Continuous>  dextrose 50% Injectable 25 Gram(s) IV Push once  dextrose 50% Injectable 12.5 Gram(s) IV Push once  dextrose 50% Injectable 25 Gram(s) IV Push once  folic acid 1 milliGRAM(s) Oral daily  glucagon  Injectable 1 milliGRAM(s) IntraMuscular once  insulin glargine Injectable (LANTUS) 20 Unit(s) SubCutaneous at bedtime  insulin lispro (ADMELOG) corrective regimen sliding scale   SubCutaneous three times a day before meals  insulin lispro Injectable (ADMELOG) 10 Unit(s) SubCutaneous three times a day before meals  magnesium oxide 400 milliGRAM(s) Oral once  multivitamin 1 Tablet(s) Oral daily  ondansetron Injectable 4 milliGRAM(s) IV Push once  pantoprazole    Tablet 40 milliGRAM(s) Oral before breakfast  thiamine 100 milliGRAM(s) Oral at bedtime    Physical Findings:  - Appearance: alert and oriented x4 per RN flow sheets  - GI function: no gastrointestinal signs/symptoms per RN flow sheets. LBM: 2/8 per RN flow sheets   - Tubes: n/a  - Oral/Mouth cavity: No chewing/swallowing difficulties reported by pt./ staff   - Skin: ecchymosis/ no edema noted per RN flow sheets      Nutrition Requirements  Weight Used: 45kg CBW - needs from last dietitian assessment on 2/5    Estimated kcal needs: 1344-1551kcal (MSJ x 1.3-1.5 AF)- severe PCM   Estimated protein needs: 59-67g (1.3-1.5g/kg IBW)  Estimated  fluids: 1ml/kcal or per LIP    Nutrient Intake: Patient is consuming 100% of meals + nutrition oral supplements         [] Previous Nutrition Diagnosis: Malnutrition severe protein calorie malnutrition in context of chronic illness.               [x] Ongoing          [] Resolved     Nutrition Intervention  meals and snacks, medical food supplements     Goal/Expected Outcome: Patient to continue to consume and tolerate ~100% of meals and medical nutrition supplements in the next 7 days.     Indicator/Monitoring: RD to monitor: diet order, body composition, energy intake, nutrition focused physical finding, glucose profile/electrolyte profile. Not at risk will f/u in 7 days. Discussed with LIP     Recommendations:    1) Continue current diet order + nutrition oral supplements to optimize PO intake

## 2021-02-10 PROBLEM — Z00.00 ENCOUNTER FOR PREVENTIVE HEALTH EXAMINATION: Status: ACTIVE | Noted: 2021-02-10

## 2021-02-10 LAB
GAMMA INTERFERON BACKGROUND BLD IA-ACNC: 0.08 IU/ML — SIGNIFICANT CHANGE UP
M TB IFN-G BLD-IMP: POSITIVE
M TB IFN-G CD4+ BCKGRND COR BLD-ACNC: 7.29 IU/ML — SIGNIFICANT CHANGE UP
M TB IFN-G CD4+CD8+ BCKGRND COR BLD-ACNC: 5.17 IU/ML — SIGNIFICANT CHANGE UP
QUANT TB PLUS MITOGEN MINUS NIL: 8.68 IU/ML — SIGNIFICANT CHANGE UP

## 2021-02-15 DIAGNOSIS — C34.90 MALIGNANT NEOPLASM OF UNSPECIFIED PART OF UNSPECIFIED BRONCHUS OR LUNG: ICD-10-CM

## 2021-02-15 DIAGNOSIS — R73.9 HYPERGLYCEMIA, UNSPECIFIED: ICD-10-CM

## 2021-02-15 DIAGNOSIS — F17.200 NICOTINE DEPENDENCE, UNSPECIFIED, UNCOMPLICATED: ICD-10-CM
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
## 2021-02-15 DIAGNOSIS — Z91.14 PATIENT'S OTHER NONCOMPLIANCE WITH MEDICATION REGIMEN: ICD-10-CM

## 2021-02-15 DIAGNOSIS — E53.8 DEFICIENCY OF OTHER SPECIFIED B GROUP VITAMINS: ICD-10-CM

## 2021-02-15 DIAGNOSIS — E87.6 HYPOKALEMIA: ICD-10-CM

## 2021-02-15 DIAGNOSIS — I95.9 HYPOTENSION, UNSPECIFIED: ICD-10-CM

## 2021-02-15 DIAGNOSIS — K70.30 ALCOHOLIC CIRRHOSIS OF LIVER WITHOUT ASCITES: ICD-10-CM

## 2021-02-15 DIAGNOSIS — E11.649 TYPE 2 DIABETES MELLITUS WITH HYPOGLYCEMIA WITHOUT COMA: ICD-10-CM

## 2021-02-15 DIAGNOSIS — R82.71 BACTERIURIA: ICD-10-CM

## 2021-02-15 DIAGNOSIS — B37.3 CANDIDIASIS OF VULVA AND VAGINA: ICD-10-CM

## 2021-02-15 DIAGNOSIS — F10.20 ALCOHOL DEPENDENCE, UNCOMPLICATED: ICD-10-CM

## 2021-02-15 DIAGNOSIS — I10 ESSENTIAL (PRIMARY) HYPERTENSION: ICD-10-CM

## 2021-02-15 DIAGNOSIS — E51.9 THIAMINE DEFICIENCY, UNSPECIFIED: ICD-10-CM

## 2021-02-15 DIAGNOSIS — E43 UNSPECIFIED SEVERE PROTEIN-CALORIE MALNUTRITION: ICD-10-CM

## 2021-02-15 DIAGNOSIS — S22.32XA FRACTURE OF ONE RIB, LEFT SIDE, INITIAL ENCOUNTER FOR CLOSED FRACTURE: ICD-10-CM
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
## 2021-02-15 DIAGNOSIS — E87.1 HYPO-OSMOLALITY AND HYPONATREMIA: ICD-10-CM

## 2021-02-15 DIAGNOSIS — J45.909 UNSPECIFIED ASTHMA, UNCOMPLICATED: ICD-10-CM

## 2021-02-15 DIAGNOSIS — K76.0 FATTY (CHANGE OF) LIVER, NOT ELSEWHERE CLASSIFIED: ICD-10-CM

## 2021-02-15 DIAGNOSIS — E11.65 TYPE 2 DIABETES MELLITUS WITH HYPERGLYCEMIA: ICD-10-CM

## 2021-02-15 DIAGNOSIS — R64 CACHEXIA: ICD-10-CM

## 2021-02-15 DIAGNOSIS — C85.90 NON-HODGKIN LYMPHOMA, UNSPECIFIED, UNSPECIFIED SITE: ICD-10-CM

## 2021-02-15 DIAGNOSIS — Z53.20 PROCEDURE AND TREATMENT NOT CARRIED OUT BECAUSE OF PATIENT'S DECISION FOR UNSPECIFIED REASONS: ICD-10-CM

## 2021-02-22 ENCOUNTER — APPOINTMENT (OUTPATIENT)
Dept: INTERNAL MEDICINE | Facility: CLINIC | Age: 56
End: 2021-02-22

## 2021-03-31 NOTE — PHYSICAL THERAPY INITIAL EVALUATION ADULT - SIT-TO-STAND BALANCE
----- Message from Danielle Martino MD sent at 3/31/2021  8:14 AM CDT -----  Cologuard is positive.  She should schedule a colonoscopy  
Informed patient of the results and patient acknowledged the results. Patient agreeable to colonoscopy. Referral placed.   
good balance

## 2021-10-14 NOTE — PROCEDURE NOTE: CLINICAL
PROCEDURE NOTE: Pneumatic Retinopexy OS. Diagnosis: Retinal Detachment, Unspecified. Anesthesia: Subconjunctival. Prep: Betadine Flush. Prior to procedure, risks/benefits/alternatives discussed including infection, loss of vision, hemorrhage, cataract, glaucoma, retinal tears or detachment and patient wished to proceed. The patient was brought to the treatment room where local anesthesia was achieved by a0.5 cc injection of 2% Xylocaine and was given in a RETROBULBAR fashion. After adequate anesthesia, the areas about the eye with the retinal detachment were prepped and draped in the usual sterile fashion. A drop of topical 5% iodine solution was applied to the globe. The patient was placed on their side and an area 4*mm posterior to the corneoscleral limbus was marked in the INF* quadrant. A 0.55*cc injection of 100% C3 F8* was made under direct visualization into the vitreous cavity. After the injection, the patient was rotated to their opposite side, and the needle was withdrawn from the vitreous cavity. A therapeutic paracentesis was performed PRIOR TO GAS INJ and 0.35* of fluid was removed. The intraocular pressure following paracentesis was 5*. The intraocular pressure 10 minutes after the procedure was *10mmHg. (The patient was given Diamox 500 mg, Alphagan, Xalatan, Azopt, Lumigan, Cosopt, Combigan, Cyclogyl, atropine, Timoptic-XE ½% one drop). Erythromycin ointment was placed into the eye and a sterile patch was placed over the eye. The patient tolerated the procedure well and left the treatment room in stable condition. There were no complications. Post-op instructions given. The patient was then discharged home on Zymar/Zymaxid/Besivance, q.i.d. and Pred Forte q.i.d. Patient given office phone number/answering service number and advised to call immediately should there be an increase in floaters or redness, loss of vision or pain, or should they have any other questions or concerns. Denisa Raya

## 2021-10-15 NOTE — PROCEDURE NOTE: CLINICAL
PROCEDURE NOTE: Laser for RD OS. Diagnosis: Retinal Detachment, Unspecified. Anesthesia: Topical. Prior to laser, risks/benefits/alternatives to laser discussed including loss of vision, decreased peripheral and night vision, need for more laser and/or surgery and patient wished to proceed. A written consent is on file, and the need for today’s laser was discussed and the patient is understanding and wishes to proceed. Spot size: 200* um. Pulse power:300 * mW. Pulse duration:80 * ms. Number [id number]*. Procedure Time: *. Patient tolerated procedure well. There were no complications. Post-op instructions given. Patient given office phone number/answering service number and advised to call immediately should there be loss of vision or pain, or should they have any other questions or concerns. Wanda Waldron

## 2021-10-19 NOTE — PATIENT DISCUSSION
Price (Use Numbers Only, No Special Characters Or $): 165 Recommend OBSERVATION and continued MONITORING for progression. Pre-Procedure Text: After consent was obtained, the treatment areas were cleansed and treated using the parameters mentioned above. Detail Level: Zone Contact: Light Energy (Optional- Include Units): 5/28 Anesthesia Volume In Cc: 0 External Cooling Fan Speed: 5 Device Serial Number (Optional): 1 of 1 Consent: Written consent obtained, risks reviewed including but not limited to crusting, scabbing, blistering, scarring, darker or lighter pigmentary change, bruising, and/or incomplete response. Post-Care Instructions: I reviewed with the patient in detail post-care instructions. Patient should stay away from the sun and wear sun protection until treated areas are fully healed. Passes: 1

## 2022-11-04 NOTE — DIETITIAN INITIAL EVALUATION ADULT. - REASON FOR ADMISSION
Cough Erythromycin Pregnancy And Lactation Text: This medication is Pregnancy Category B and is considered safe during pregnancy. It is also excreted in breast milk.

## 2022-11-07 NOTE — H&P ADULT - NSHPRISKHIVSCREEN_GEN_ALL_CORE
Presents for routine  appointment.     No abnormal discharge, no leaking fluid, no contractions, no vaginal bleeding   ROS:   and GI  negative.     Please see Prenatal Vitals and Notes Flowsheet for objective data.    A/P:  36 year old  at 35w2d       ICD-10-CM    1. Gestational hypertension, third trimester  O13.3 CBC with platelets     Comprehensive metabolic panel (BMP + Alb, Alk Phos, ALT, AST, Total. Bili, TP)     Protein  random urine      2. History of 2  sections  Z98.891       3. Encounter for sterilization  Z30.2       4. History of pre-eclampsia  Z87.59       5. 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  Weekly labs.  BPs checks at home and in the clinic 1-2 times per week  1st preg was c/b oligo and SGA.  - BPPs  weekly. Growths every 4 weeks at [facility].    History of  x2, plan repeat with tubal scheduled at 37w 2d on Monday the .  Plan preop next visit  Anti-E - continue monthly titers.  repeat next week  Follow up in 1 week.  BP check later this week.       Jacquelyn Joya MD       Offered and patient declined

## 2022-12-07 ENCOUNTER — NEW PATIENT (OUTPATIENT)
Dept: RURAL CLINIC 3 | Facility: CLINIC | Age: 57
End: 2022-12-07

## 2022-12-07 PROCEDURE — 99203 OFFICE O/P NEW LOW 30 MIN: CPT

## 2022-12-07 ASSESSMENT — TONOMETRY
OD_IOP_MMHG: 15
OS_IOP_MMHG: 15

## 2022-12-27 NOTE — PATIENT DISCUSSION
10/15/21 ATTACHED.
10/19/21 MILD FLUID TRAPPED, TO KEEP HEAD DOWN 1 HR QD.
ARTIFICIAL TEARS to affected eye(s) as needed.
BMI above normal limits, recommended weight loss, improve diet and follow up with internist.
Continue current management.
Does NOT APPEAR VISUALLY SIGNIFICANT.
ERM does NOT APPEAR VISUALLY SIGNIFICANT.
HAS METAMORPHOPSIA.(WARPED IMAGES).
MONITOR response to TREATMENT.
My findings and recommendations are based on patient's symptoms, eye exam, diagnostic testing, and records.
NON SMOKER.
No new tear/breaks/detachment seen TODAY.
No retinal tears or retinal detachment seen on clinical exam today. Reviewed the signs and symptoms of retinal tear/retinal detachment and the importance of calling for prompt evaluation should there be increasing floaters, new flashing lights, or decreasing peripheral vision in either eye at any time. Observation recommended.
PLAN PPV 10/21/21 IF NOT REATTACHED.
Recommend OBSERVATION and continued MONITORING for progression.
Recommended OBSERVATION and MONITORING for change.
Recommended OBSERVATION and continued MONITORING for progression.
Retina ATTACHED and doing well today.
Well positioned.
n/a

## 2023-01-16 ENCOUNTER — CONSULTATION/EVALUATION (OUTPATIENT)
Dept: RURAL CLINIC 3 | Facility: CLINIC | Age: 58
End: 2023-01-16

## 2023-01-16 DIAGNOSIS — H25.813: ICD-10-CM

## 2023-01-16 PROCEDURE — 99214 OFFICE O/P EST MOD 30 MIN: CPT

## 2023-01-16 ASSESSMENT — TONOMETRY
OD_IOP_MMHG: 15
OS_IOP_MMHG: 14

## 2023-01-27 ENCOUNTER — PRE-OP/H&P (OUTPATIENT)
Dept: RURAL CLINIC 3 | Facility: CLINIC | Age: 58
End: 2023-01-27

## 2023-01-27 VITALS
DIASTOLIC BLOOD PRESSURE: 71 MMHG | BODY MASS INDEX: 18.27 KG/M2 | HEIGHT: 64 IN | WEIGHT: 107 LBS | HEART RATE: 92 BPM | SYSTOLIC BLOOD PRESSURE: 111 MMHG

## 2023-01-27 DIAGNOSIS — Z01.818: ICD-10-CM

## 2023-01-27 PROCEDURE — 99499 UNLISTED E&M SERVICE: CPT

## 2023-03-01 NOTE — PHYSICAL THERAPY INITIAL EVALUATION ADULT - GAIT DEVIATIONS NOTED, PT EVAL
Mildly decreased b/l knee flexion in swing phases, intermittent veering to L and R 01-Mar-2023 21:24